# Patient Record
Sex: FEMALE | Race: WHITE | NOT HISPANIC OR LATINO | Employment: FULL TIME | URBAN - METROPOLITAN AREA
[De-identification: names, ages, dates, MRNs, and addresses within clinical notes are randomized per-mention and may not be internally consistent; named-entity substitution may affect disease eponyms.]

---

## 2021-01-01 ENCOUNTER — TRANSFERRED RECORDS (OUTPATIENT)
Dept: MULTI SPECIALTY CLINIC | Facility: CLINIC | Age: 66
End: 2021-01-01

## 2023-10-03 ENCOUNTER — TRANSFERRED RECORDS (OUTPATIENT)
Dept: MULTI SPECIALTY CLINIC | Facility: CLINIC | Age: 68
End: 2023-10-03

## 2023-10-03 LAB — RETINOPATHY: NORMAL

## 2023-12-04 ENCOUNTER — OFFICE VISIT (OUTPATIENT)
Dept: FAMILY MEDICINE | Facility: CLINIC | Age: 68
End: 2023-12-04
Payer: COMMERCIAL

## 2023-12-04 VITALS
TEMPERATURE: 97.4 F | WEIGHT: 165.5 LBS | HEIGHT: 66 IN | DIASTOLIC BLOOD PRESSURE: 83 MMHG | OXYGEN SATURATION: 97 % | BODY MASS INDEX: 26.6 KG/M2 | SYSTOLIC BLOOD PRESSURE: 123 MMHG | RESPIRATION RATE: 16 BRPM | HEART RATE: 88 BPM

## 2023-12-04 DIAGNOSIS — Z13.220 LIPID SCREENING: ICD-10-CM

## 2023-12-04 DIAGNOSIS — E11.9 TYPE 2 DIABETES MELLITUS WITHOUT COMPLICATION, WITHOUT LONG-TERM CURRENT USE OF INSULIN (H): ICD-10-CM

## 2023-12-04 DIAGNOSIS — L30.9 VULVAR DERMATITIS: ICD-10-CM

## 2023-12-04 DIAGNOSIS — Z85.3 HISTORY OF BREAST CANCER: ICD-10-CM

## 2023-12-04 DIAGNOSIS — Z00.00 ENCOUNTER FOR MEDICARE ANNUAL WELLNESS EXAM: Primary | ICD-10-CM

## 2023-12-04 DIAGNOSIS — Z12.31 VISIT FOR SCREENING MAMMOGRAM: ICD-10-CM

## 2023-12-04 DIAGNOSIS — Z78.0 POST-MENOPAUSAL: ICD-10-CM

## 2023-12-04 PROCEDURE — 99214 OFFICE O/P EST MOD 30 MIN: CPT | Mod: 25 | Performed by: FAMILY MEDICINE

## 2023-12-04 PROCEDURE — 99387 INIT PM E/M NEW PAT 65+ YRS: CPT | Performed by: FAMILY MEDICINE

## 2023-12-04 RX ORDER — CLOBETASOL PROPIONATE 0.5 MG/G
OINTMENT TOPICAL 2 TIMES DAILY
Qty: 60 G | Refills: 1 | Status: SHIPPED | OUTPATIENT
Start: 2023-12-04 | End: 2024-03-01

## 2023-12-04 SDOH — HEALTH STABILITY: PHYSICAL HEALTH: ON AVERAGE, HOW MANY DAYS PER WEEK DO YOU ENGAGE IN MODERATE TO STRENUOUS EXERCISE (LIKE A BRISK WALK)?: 2 DAYS

## 2023-12-04 ASSESSMENT — ENCOUNTER SYMPTOMS
NERVOUS/ANXIOUS: 0
HEMATOCHEZIA: 0
SORE THROAT: 0
BREAST MASS: 0
HEMATURIA: 0
ABDOMINAL PAIN: 0
MYALGIAS: 0
ARTHRALGIAS: 0
NAUSEA: 0
COUGH: 0
DIARRHEA: 0
HEARTBURN: 0
FEVER: 0
SHORTNESS OF BREATH: 0
DIZZINESS: 0
DYSURIA: 0
FREQUENCY: 0
CONSTIPATION: 0
JOINT SWELLING: 0
CHILLS: 0
WEAKNESS: 0
HEADACHES: 0
EYE PAIN: 0
PALPITATIONS: 0

## 2023-12-04 ASSESSMENT — SOCIAL DETERMINANTS OF HEALTH (SDOH)
IN A TYPICAL WEEK, HOW MANY TIMES DO YOU TALK ON THE PHONE WITH FAMILY, FRIENDS, OR NEIGHBORS?: MORE THAN THREE TIMES A WEEK
HOW OFTEN DO YOU ATTENT MEETINGS OF THE CLUB OR ORGANIZATION YOU BELONG TO?: MORE THAN 4 TIMES PER YEAR
DO YOU BELONG TO ANY CLUBS OR ORGANIZATIONS SUCH AS CHURCH GROUPS UNIONS, FRATERNAL OR ATHLETIC GROUPS, OR SCHOOL GROUPS?: YES

## 2023-12-04 ASSESSMENT — PAIN SCALES - GENERAL: PAINLEVEL: NO PAIN (0)

## 2023-12-04 ASSESSMENT — LIFESTYLE VARIABLES: HOW OFTEN DO YOU HAVE A DRINK CONTAINING ALCOHOL: 2-4 TIMES A MONTH

## 2023-12-04 ASSESSMENT — ACTIVITIES OF DAILY LIVING (ADL): CURRENT_FUNCTION: NO ASSISTANCE NEEDED

## 2023-12-04 NOTE — PROGRESS NOTES
"SUBJECTIVE:   Suyapa is a 68 year old, presenting for the following:  Physical        12/4/2023     3:38 PM   Additional Questions   Roomed by Zelda GEORGE CMA     Here for physical.    Rash on inner labia, red, sometimes itchy.  Has tried different soaps, etc.  OTC topicals.  Nothing helps.    Colonoscopy due in 2026, done in 2021 [facility] in [place].    Are you in the first 12 months of your Medicare coverage?  No    Healthy Habits:     In general, how would you rate your overall health?  Excellent    Frequency of exercise:  2-3 days/week    Duration of exercise:  15-30 minutes    Do you usually eat at least 4 servings of fruit and vegetables a day, include whole grains    & fiber and avoid regularly eating high fat or \"junk\" foods?  Yes    Taking medications regularly:  Yes    Medication side effects:  None    Ability to successfully perform activities of daily living:  No assistance needed    Home Safety:  No safety concerns identified    Hearing Impairment:  Difficulty following a conversation in a noisy restaurant or crowded room    In the past 6 months, have you been bothered by leaking of urine? Yes    In general, how would you rate your overall mental or emotional health?  Excellent    Additional concerns today:  Yes    Today's PHQ-2 Score:       12/4/2023     3:32 PM   PHQ-2 ( 1999 [employer])   Q1: Little interest or pleasure in doing things 0   Q2: Feeling down, depressed or hopeless 0   PHQ-2 Score 0   Q1: Little interest or pleasure in doing things Not at all   Q2: Feeling down, depressed or hopeless Not at all   PHQ-2 Score 0     Have you ever done Advance Care Planning? (For example, a Health Directive, POLST, or a discussion with a medical provider or your loved ones about your wishes): No, advance care planning information given to patient to review.  Patient declined advance care planning discussion at this time.     Fall risk  Fallen 2 or more times in the past year?: No  Any fall with injury in the past " year?: No    Cognitive Screening   1) Repeat 3 items (Leader, Season, Table)    2) Clock draw: NORMAL  3) 3 item recall: Recalls NO objects   Results: 0 items recalled: PROBABLE COGNITIVE IMPAIRMENT, **INFORM PROVIDER**    Mini-CogTM Copyright ANNE MARIE Moreau. Licensed by the author for use in [facility]; reprinted with permission (brennan@[facility]). All rights reserved.      Reviewed and updated as needed this visit by clinical staff   Tobacco  Allergies  Meds   Med Hx  Surg Hx  Fam Hx        Reviewed and updated as needed this visit by Provider   Tobacco   Meds   Med Hx  Surg Hx  Fam Hx         Social History     Tobacco Use    Smoking status: Former     Packs/day: 0.50     Years: 9.00     Additional pack years: 0.00     Total pack years: 4.50     Types: Cigarettes     Start date: 1974     Quit date: 1983     Years since quittin.9    Smokeless tobacco: Never   Substance Use Topics    Alcohol use: Yes     Comment: [email or web address]         2023     3:32 PM   Alcohol Use   Prescreen: >3 drinks/day or >7 drinks/week? No     Do you have a current opioid prescription? No  Do you use any other controlled substances or medications that are not prescribed by a provider? None      Current providers sharing in care for this patient include:   Patient Care Team:  Jessica, Kendal Benavidez as PCP - General    The following health maintenance items are reviewed in Epic and correct as of today:  Health Maintenance   Topic Date Due    DEXA  Never done    COLORECTAL CANCER SCREENING  Never done    LIPID  Never done    ZOSTER IMMUNIZATION (1 of 2) Never done    MAMMO SCREENING  2014    RSV VACCINE (Pregnancy & 60+) (1 - 1-dose 60+ series) Never done    Pneumococcal Vaccine: 65+ Years (2 - PCV) 10/05/2022    MEDICARE ANNUAL WELLNESS VISIT  2024    ANNUAL REVIEW OF HM ORDERS  2024    FALL RISK ASSESSMENT  2024    DTAP/TDAP/TD IMMUNIZATION (4 - Td or Tdap) 2028    ADVANCE CARE PLANNING   2028    HEPATITIS C SCREENING  Completed    PHQ-2 (once per calendar year)  Completed    INFLUENZA VACCINE  Completed    COVID-19 Vaccine  Completed    IPV IMMUNIZATION  Aged Out    HPV IMMUNIZATION  Aged Out    MENINGITIS IMMUNIZATION  Aged Out    RSV MONOCLONAL ANTIBODY  Aged Out     Lab work is in process  Labs reviewed in EPIC  BP Readings from Last 3 Encounters:   23 123/83   14 142/78   12 129/75    Wt Readings from Last 3 Encounters:   23 75.1 kg (165 lb 8 oz)   14 93.4 kg (206 lb)   12 81.6 kg (180 lb)                  Patient Active Problem List   Diagnosis    History of breast cancer    Type 2 diabetes mellitus without complication, without long-term current use of insulin (H)     Past Surgical History:   Procedure Laterality Date    BIOPSY BREAST NEEDLE LOCALIZATION  2012    Procedure: BIOPSY BREAST NEEDLE LOCALIZATION;  Left Breast Wire Localized Excisional Biopsy ;  Surgeon: Festus Solano MD;  Location: [facility] OR    pre-cancerous cells on face removed[      wisdom teeth[         Social History     Tobacco Use    Smoking status: Former     Packs/day: 0.50     Years: 9.00     Additional pack years: 0.00     Total pack years: 4.50     Types: Cigarettes     Start date: 1974     Quit date: 1983     Years since quittin.9    Smokeless tobacco: Never   Substance Use Topics    Alcohol use: Yes     Comment: occas     Family History   Problem Relation Age of Onset    Memory loss Mother     Lung Cancer Mother     Memory loss Father          Current Outpatient Medications   Medication Sig Dispense Refill    clobetasol (TEMOVATE) 0.05 % external ointment Apply topically 2 times daily 60 g 1    UNABLE TO FIND MEDICATION NAME: Plexus Ease, Plexus Slim       No Known Allergies  No lab results found.       Mammogram Screening: Mammogram Screening: Recommended mammography every 1-2 years with patient discussion and risk factor consideration    FHS-7:        "12/4/2023     3:37 PM   Breast CA Risk Assessment (FHS-7)   Did any of your first-degree relatives have breast or ovarian cancer? Yes   Did any of your relatives have bilateral breast cancer? No   Did any man in your family have breast cancer? No   Did any woman in your family have breast and ovarian cancer? Yes   Did any woman in your family have breast cancer before age 50 y? No   Do you have 2 or more relatives with breast and/or ovarian cancer? No   Do you have 2 or more relatives with breast and/or bowel cancer? No       Mammogram Screening: Recommended mammography every 1-2 years with patient discussion and risk factor consideration  Pertinent mammograms are reviewed under the imaging tab.    Review of Systems   Constitutional:  Negative for chills and fever.   HENT:  Negative for congestion, ear pain, hearing loss and sore throat.    Eyes:  Negative for pain and visual disturbance.   Respiratory:  Negative for cough and shortness of breath.    Cardiovascular:  Negative for chest pain, palpitations and peripheral edema.   Gastrointestinal:  Negative for abdominal pain, constipation, diarrhea, heartburn, hematochezia and nausea.   Breasts:  Negative for tenderness, breast mass and discharge.   Genitourinary:  Positive for urgency. Negative for dysuria, frequency, genital sores, hematuria, pelvic pain, vaginal bleeding and vaginal discharge.   Musculoskeletal:  Negative for arthralgias, joint swelling and myalgias.   Skin:  Negative for rash.   Neurological:  Negative for dizziness, weakness and headaches.   Psychiatric/Behavioral:  Negative for mood changes. The patient is not nervous/anxious.          OBJECTIVE:   /83 (BP Location: Right arm, Patient Position: Sitting, Cuff Size: Adult Regular)   Pulse 88   Temp 97.4  F (36.3  C) (Oral)   Resp 16   Ht 1.664 m (5' 5.5\")   Wt 75.1 kg (165 lb 8 oz)   LMP  (LMP Unknown)   SpO2 97%   BMI 27.12 kg/m   Estimated body mass index is 27.12 kg/m  as " "calculated from the following:    Height as of this encounter: 1.664 m (5' 5.5\").    Weight as of this encounter: 75.1 kg (165 lb 8 oz).  Physical Exam  GENERAL: healthy, alert and no distress  RESP: lungs clear to auscultation - no rales, rhonchi or wheezes  CV: regular rates and rhythm   (female): vulvar skin dry and erythematous    Diagnostic Test Results:  Labs reviewed in Epic    ASSESSMENT / PLAN:       ICD-10-CM    1. Encounter for Medicare annual wellness exam  Z00.00 DX Hip/Pelvis/Spine      2. Vulvar dermatitis  L30.9 clobetasol (TEMOVATE) 0.05 % external ointment      3. Type 2 diabetes mellitus without complication, without long-term current use of insulin (H)  E11.9 Patient had A1c done in 2021, was 8.1, has not done any follow up.  Does not take medication currently, does not check sugars.  Will likely get her set up with a glucometer and diabetes education regardless of result, consider medication pending results.  Hemoglobin A1c      4. Post-menopausal  Z78.0 DX Hip/Pelvis/Spine      5. Visit for screening mammogram  Z12.31 MA Screen Bilateral w/Roberto      6. History of breast cancer  Z85.3 MA Screen Bilateral w/Roberto      7. Lipid screening  Z13.220 Lipid panel reflex to direct LDL Fasting          Patient has been advised of split billing requirements and indicates understanding: Yes    COUNSELING:  Reviewed preventive health counseling, as reflected in patient instructions       Immunizations  Declined immunizations        She reports that she quit smoking about 40 years ago. Her smoking use included cigarettes. She started smoking about 49 years ago. She has a 4.50 pack-year smoking history. She has never used smokeless tobacco.    Appropriate preventive services were discussed with this patient, including applicable screening as appropriate for fall prevention, nutrition, physical activity, Tobacco-use cessation, weight loss and cognition.  Checklist reviewing preventive services available has " been given to the patient.    Reviewed patients plan of care and provided an AVS. The Basic Care Plan (routine screening as documented in Health Maintenance) for Suyapa meets the Care Plan requirement. This Care Plan has been established and reviewed with the Patient.        Maribell Vásquez MD  [facility]    Identified Health Risks:  I have reviewed Opioid Use Disorder and Substance Use Disorder risk factors and made any needed referrals. The patient was provided with written information regarding signs of hearing loss.  Information on urinary incontinence and treatment options given to patient.

## 2023-12-04 NOTE — Clinical Note
Please abstract the following data from this visit with this patient into the appropriate field in Epic:  Tests that can be patient reported without a hard copy:  Colonoscopy done on this date: 2021 [facility] in [place]. (approximately), by this group: results were normal.   Other Tests found in the patient's chart through Chart Review/Care Everywhere:    Note to Abstraction: If this section is blank, no results were found via Chart Review/Care Everywhere.

## 2023-12-04 NOTE — PATIENT INSTRUCTIONS
Need Shingles vaccines (2), RSV, and Pneumonia vaccine (Prevnar 20)    Patient Education   Personalized Prevention Plan  You are due for the preventive services outlined below.  Your care team is available to assist you in scheduling these services.  If you have already completed any of these items, please share that information with your care team to update in your medical record.  Health Maintenance Due   Topic Date Due    Osteoporosis Screening  Never done    ANNUAL REVIEW OF HM ORDERS  Never done    Colorectal Cancer Screening  Never done    Hepatitis C Screening  Never done    Cholesterol Lab  Never done    Zoster (Shingles) Vaccine (1 of 2) Never done    LUNG CANCER SCREENING  Never done    Mammogram  07/25/2014    RSV VACCINE (Pregnancy & 60+) (1 - 1-dose 60+ series) Never done    Annual Wellness Visit  10/05/2022    Pneumococcal Vaccine (2 - PCV) 10/05/2022     Hearing Loss: Care Instructions  Overview     Hearing loss is a sudden or slow decrease in how well you hear. It can range from slight to profound. Permanent hearing loss can occur with aging. It also can happen when you are exposed long-term to loud noise. Examples include listening to loud music, riding motorcycles, or being around other loud machines.  Hearing loss can affect your work and home life. It can make you feel lonely or depressed. You may feel that you have lost your independence. But hearing aids and other devices can help you hear better and feel connected to others.  Follow-up care is a key part of your treatment and safety. Be sure to make and go to all appointments, and call your doctor if you are having problems. It's also a good idea to know your test results and keep a list of the medicines you take.  How can you care for yourself at home?  Avoid loud noises whenever possible. This helps keep your hearing from getting worse.  Always wear hearing protection around loud noises.  Wear a hearing aid as directed.  A professional can  "help you pick a hearing aid that will work best for you.  You can also get hearing aids over the counter for mild to moderate hearing loss.  Have hearing tests as your doctor suggests. They can show whether your hearing has changed. Your hearing aid may need to be adjusted.  Use other devices as needed. These may include:  Telephone amplifiers and hearing aids that can connect to a television, stereo, radio, or microphone.  Devices that use lights or vibrations. These alert you to the doorbell, a ringing telephone, or a baby monitor.  Television closed-captioning. This shows the words at the bottom of the screen. Most new TVs can do this.  TTY (text telephone). This lets you type messages back and forth on the telephone instead of talking or listening. These devices are also called TDD. When messages are typed on the keyboard, they are sent over the phone line to a receiving TTY. The message is shown on a monitor.  Use text messaging, social media, and email if it is hard for you to communicate by telephone.  Try to learn a listening technique called speechreading. It is not lipreading. You pay attention to people's gestures, expressions, posture, and tone of voice. These clues can help you understand what a person is saying. Face the person you are talking to, and have them face you. Make sure the lighting is good. You need to see the other person's face clearly.  Think about counseling if you need help to adjust to your hearing loss.  When should you call for help?  Watch closely for changes in your health, and be sure to contact your doctor if:    You think your hearing is getting worse.     You have new symptoms, such as dizziness or nausea.   Where can you learn more?  Go to https://www.[employer].net/patiented  Enter R798 in the search box to learn more about \"Hearing Loss: Care Instructions.\"  Current as of: February 28, 2023               Content Version: 13.8    1255-0683 [employer].   Care " instructions adapted under license by your healthcare professional. If you have questions about a medical condition or this instruction, always ask your healthcare professional. [employer] disclaims any warranty or liability for your use of this information.      Bladder Training: Care Instructions  Your Care Instructions     Bladder training is used to treat urge incontinence and stress incontinence. Urge incontinence means that the need to urinate comes on so fast that you can't get to a toilet in time. Stress incontinence means that you leak urine because of pressure on your bladder. For example, it may happen when you laugh, cough, or lift something heavy.  Bladder training can increase how long you can wait before you have to urinate. It can also help your bladder hold more urine. And it can give you better control over the urge to urinate.  It is important to remember that bladder training takes a few weeks to a few months to make a difference. You may not see results right away, but don't give up.  Follow-up care is a key part of your treatment and safety. Be sure to make and go to all appointments, and call your doctor if you are having problems. It's also a good idea to know your test results and keep a list of the medicines you take.  How can you care for yourself at home?  Work with your doctor to come up with a bladder training program that is right for you. You may use one or more of the following methods.  Delayed urination  In the beginning, try to keep from urinating for 5 minutes after you first feel the need to go.  While you wait, take deep, slow breaths to relax. Kegel exercises can also help you delay the need to go to the bathroom.  After some practice, when you can easily wait 5 minutes to urinate, try to wait 10 minutes before you urinate.  Slowly increase the waiting period until you are able to control when you have to urinate.  Scheduled urination  Empty your bladder when you  "first wake up in the morning.  Schedule times throughout the day when you will urinate.  Start by going to the bathroom every hour, even if you don't need to go.  Slowly increase the time between trips to the bathroom.  When you have found a schedule that works well for you, keep doing it.  If you wake up during the night and have to urinate, do it. Apply your schedule to waking hours only.  Kegel exercises  These tighten and strengthen pelvic muscles, which can help you control the flow of urine. (If doing these exercises causes pain, stop doing them and talk with your doctor.) To do Kegel exercises:  Squeeze your muscles as if you were trying not to pass gas. Or squeeze your muscles as if you were stopping the flow of urine. Your belly, legs, and buttocks shouldn't move.  Hold the squeeze for 3 seconds, then relax for 5 to 10 seconds.  Start with 3 seconds, then add 1 second each week until you are able to squeeze for 10 seconds.  Repeat the exercise 10 times a session. Do 3 to 8 sessions a day.  When should you call for help?  Watch closely for changes in your health, and be sure to contact your doctor if:    Your incontinence is getting worse.     You do not get better as expected.   Where can you learn more?  Go to https://www.[employer].net/patiented  Enter V684 in the search box to learn more about \"Bladder Training: Care Instructions.\"  Current as of: February 28, 2023               Content Version: 13.8    0266-9468 [employer].   Care instructions adapted under license by your healthcare professional. If you have questions about a medical condition or this instruction, always ask your healthcare professional. [employer] disclaims any warranty or liability for your use of this information.         "

## 2024-01-14 ENCOUNTER — HEALTH MAINTENANCE LETTER (OUTPATIENT)
Age: 69
End: 2024-01-14

## 2024-01-16 ENCOUNTER — ANCILLARY PROCEDURE (OUTPATIENT)
Dept: MAMMOGRAPHY | Facility: CLINIC | Age: 69
End: 2024-01-16
Attending: FAMILY MEDICINE
Payer: COMMERCIAL

## 2024-01-16 DIAGNOSIS — Z12.31 VISIT FOR SCREENING MAMMOGRAM: ICD-10-CM

## 2024-01-16 DIAGNOSIS — Z85.3 HISTORY OF BREAST CANCER: ICD-10-CM

## 2024-01-16 PROCEDURE — 77067 SCR MAMMO BI INCL CAD: CPT | Mod: TC | Performed by: RADIOLOGY

## 2024-01-16 PROCEDURE — 77063 BREAST TOMOSYNTHESIS BI: CPT | Mod: TC | Performed by: RADIOLOGY

## 2024-02-06 ENCOUNTER — LAB (OUTPATIENT)
Dept: LAB | Facility: CLINIC | Age: 69
End: 2024-02-06
Payer: COMMERCIAL

## 2024-02-06 DIAGNOSIS — E11.9 TYPE 2 DIABETES MELLITUS WITHOUT COMPLICATION, WITHOUT LONG-TERM CURRENT USE OF INSULIN (H): ICD-10-CM

## 2024-02-06 DIAGNOSIS — Z13.220 LIPID SCREENING: ICD-10-CM

## 2024-02-06 LAB — HBA1C MFR BLD: 13.8 % (ref 0–5.6)

## 2024-02-06 PROCEDURE — 83036 HEMOGLOBIN GLYCOSYLATED A1C: CPT

## 2024-02-06 PROCEDURE — 80061 LIPID PANEL: CPT

## 2024-02-06 PROCEDURE — 80048 BASIC METABOLIC PNL TOTAL CA: CPT

## 2024-02-06 PROCEDURE — 82570 ASSAY OF URINE CREATININE: CPT

## 2024-02-06 PROCEDURE — 36415 COLL VENOUS BLD VENIPUNCTURE: CPT

## 2024-02-06 PROCEDURE — 82043 UR ALBUMIN QUANTITATIVE: CPT

## 2024-02-07 LAB
ANION GAP SERPL CALCULATED.3IONS-SCNC: 9 MMOL/L (ref 7–15)
BUN SERPL-MCNC: 12.2 MG/DL (ref 8–23)
CALCIUM SERPL-MCNC: 10 MG/DL (ref 8.8–10.2)
CHLORIDE SERPL-SCNC: 102 MMOL/L (ref 98–107)
CHOLEST SERPL-MCNC: 285 MG/DL
CREAT SERPL-MCNC: 0.6 MG/DL (ref 0.51–0.95)
CREAT UR-MCNC: 58.7 MG/DL
DEPRECATED HCO3 PLAS-SCNC: 26 MMOL/L (ref 22–29)
EGFRCR SERPLBLD CKD-EPI 2021: >90 ML/MIN/1.73M2
FASTING STATUS PATIENT QL REPORTED: YES
GLUCOSE SERPL-MCNC: 342 MG/DL (ref 70–99)
HDLC SERPL-MCNC: 54 MG/DL
LDLC SERPL CALC-MCNC: 197 MG/DL
MICROALBUMIN UR-MCNC: 48.7 MG/L
MICROALBUMIN/CREAT UR: 82.96 MG/G CR (ref 0–25)
NONHDLC SERPL-MCNC: 231 MG/DL
POTASSIUM SERPL-SCNC: 4.4 MMOL/L (ref 3.4–5.3)
SODIUM SERPL-SCNC: 137 MMOL/L (ref 135–145)
TRIGL SERPL-MCNC: 168 MG/DL

## 2024-02-11 DIAGNOSIS — E78.5 HYPERLIPIDEMIA LDL GOAL <100: ICD-10-CM

## 2024-02-11 DIAGNOSIS — R80.9 TYPE 2 DIABETES MELLITUS WITH MICROALBUMINURIA, WITHOUT LONG-TERM CURRENT USE OF INSULIN (H): Primary | ICD-10-CM

## 2024-02-11 DIAGNOSIS — E11.29 TYPE 2 DIABETES MELLITUS WITH MICROALBUMINURIA, WITHOUT LONG-TERM CURRENT USE OF INSULIN (H): Primary | ICD-10-CM

## 2024-02-11 RX ORDER — LANCETS
EACH MISCELLANEOUS
Qty: 100 EACH | Refills: 6 | Status: SHIPPED | OUTPATIENT
Start: 2024-02-11

## 2024-02-11 RX ORDER — METFORMIN HCL 500 MG
TABLET, EXTENDED RELEASE 24 HR ORAL
Qty: 360 TABLET | Refills: 0 | Status: SHIPPED | OUTPATIENT
Start: 2024-02-11 | End: 2024-05-21

## 2024-02-11 RX ORDER — ATORVASTATIN CALCIUM 20 MG/1
20 TABLET, FILM COATED ORAL DAILY
Qty: 90 TABLET | Refills: 3 | Status: SHIPPED | OUTPATIENT
Start: 2024-02-11

## 2024-02-12 ENCOUNTER — TELEPHONE (OUTPATIENT)
Dept: FAMILY MEDICINE | Facility: CLINIC | Age: 69
End: 2024-02-12
Payer: COMMERCIAL

## 2024-02-12 NOTE — TELEPHONE ENCOUNTER
See message below concerning result notice     April Edith Vásquez MD  2/11/2024  6:59 PM CST Back to Top      New SB3, please use EDMUND or any spot to get her in to discuss diabetes, A1c 13.8.  She had an A1c of 8 several years ago and did nothing.      Append Comments   Seen    [place],     Your A1c came back much higher than when you last checked it.  You have very uncontrolled diabetes and it is important that we get this managed.  I am going to send in a prescription for you to start taking right away, called Metformin.  You are likely going to need more than one medication to get this under control, but I would like to have you start this now.  Someone from our clinic will call you to schedule a visit for us to talk more about this as soon as possible     I have placed orders for the diabetes educators to call you and schedule a visit as well.  Someone will also call you to schedule a diabetic eye exam.       I am placing orders for a glucometer to check your blood sugars.     - LMTCB # 1. When calls back, please assist in scheduling visit with [facility] in EDMUND spot.    Karl Brantley RN  Patient Advocate Liaison (PAL)  [facility]  [phone]    02/12/2024 at 9:29 AM

## 2024-02-13 NOTE — TELEPHONE ENCOUNTER
- Called and spoke with patient, states that she actually wants to establish care in [place] as it is closer to her house. Patient requests number to call to schedule. Provided phone number, and advised that if they are booking out a ways, can still schedule with Dr. JOE to get started on the DM Follow-up. Provided patient with PAL RN direct phone number.     - Patient will call over to [facility] to schedule, and if they are booked out a ways, can schedule in a EDMUND with Dr. ACH. Patient understands and will call back if there is a long wait.     - Patient also still thinking about the [facility] referral. PAL RN provided information pertaining to what would be done with CDE and the purpose of the program. Patient states that she will continue to consider, and will call to schedule if she wants.     Karl Brantley, RN  Patient Advocate Liaison (PAL)  [facility]  [phone]    02/13/2024 at 1:19 PM

## 2024-02-24 ENCOUNTER — TRANSFERRED RECORDS (OUTPATIENT)
Dept: MULTI SPECIALTY CLINIC | Facility: CLINIC | Age: 69
End: 2024-02-24

## 2024-02-24 LAB — RETINOPATHY: NORMAL

## 2024-03-01 ENCOUNTER — OFFICE VISIT (OUTPATIENT)
Dept: PEDIATRICS | Facility: CLINIC | Age: 69
End: 2024-03-01
Payer: COMMERCIAL

## 2024-03-01 VITALS
RESPIRATION RATE: 16 BRPM | DIASTOLIC BLOOD PRESSURE: 60 MMHG | HEIGHT: 66 IN | BODY MASS INDEX: 26.2 KG/M2 | OXYGEN SATURATION: 98 % | TEMPERATURE: 97.3 F | HEART RATE: 76 BPM | SYSTOLIC BLOOD PRESSURE: 114 MMHG | WEIGHT: 163 LBS

## 2024-03-01 DIAGNOSIS — Z13.820 SCREENING FOR OSTEOPOROSIS: ICD-10-CM

## 2024-03-01 DIAGNOSIS — Z85.3 HISTORY OF BREAST CANCER: ICD-10-CM

## 2024-03-01 DIAGNOSIS — R80.9 TYPE 2 DIABETES MELLITUS WITH MICROALBUMINURIA, WITHOUT LONG-TERM CURRENT USE OF INSULIN (H): ICD-10-CM

## 2024-03-01 DIAGNOSIS — Z76.89 ENCOUNTER TO ESTABLISH CARE: Primary | ICD-10-CM

## 2024-03-01 DIAGNOSIS — L30.9 VULVAR DERMATITIS: ICD-10-CM

## 2024-03-01 DIAGNOSIS — E11.29 TYPE 2 DIABETES MELLITUS WITH MICROALBUMINURIA, WITHOUT LONG-TERM CURRENT USE OF INSULIN (H): ICD-10-CM

## 2024-03-01 DIAGNOSIS — E66.3 OVERWEIGHT (BMI 25.0-29.9): ICD-10-CM

## 2024-03-01 DIAGNOSIS — E11.9 TYPE 2 DIABETES MELLITUS WITHOUT COMPLICATION, WITHOUT LONG-TERM CURRENT USE OF INSULIN (H): ICD-10-CM

## 2024-03-01 DIAGNOSIS — E78.5 HYPERLIPIDEMIA LDL GOAL <100: ICD-10-CM

## 2024-03-01 PROCEDURE — 99215 OFFICE O/P EST HI 40 MIN: CPT | Mod: 25 | Performed by: NURSE PRACTITIONER

## 2024-03-01 PROCEDURE — 36415 COLL VENOUS BLD VENIPUNCTURE: CPT | Performed by: NURSE PRACTITIONER

## 2024-03-01 PROCEDURE — 90471 IMMUNIZATION ADMIN: CPT | Performed by: NURSE PRACTITIONER

## 2024-03-01 PROCEDURE — 84443 ASSAY THYROID STIM HORMONE: CPT | Performed by: NURSE PRACTITIONER

## 2024-03-01 PROCEDURE — 90677 PCV20 VACCINE IM: CPT | Performed by: NURSE PRACTITIONER

## 2024-03-01 RX ORDER — CLOBETASOL PROPIONATE 0.5 MG/G
OINTMENT TOPICAL 2 TIMES DAILY
Qty: 60 G | Refills: 1 | Status: SHIPPED | OUTPATIENT
Start: 2024-03-01

## 2024-03-01 ASSESSMENT — PAIN SCALES - GENERAL: PAINLEVEL: NO PAIN (0)

## 2024-03-01 NOTE — PROGRESS NOTES
Assessment & Plan     Encounter to establish care  Histories and medications reviewed and updated.     Screening for osteoporosis  - DX Hip/Pelvis/Spine; Future    History of breast cancer  Pt with hx 4 mm calcification found in left breast on routine mammo in 2012. Biopsy revealed lobular carcinoma in situ. Biopsy did not suggest estrogen dependence/independence. Surgically removed August 2012. She weaned off estradiol at that time as she had been having some postmenopausal bleeding. Pelvic US showed multiple fibroids and endometrial biopsy was benign. No longer following with any specialist, annual mammograms for surveillance.     Overweight (BMI 25.0-29.9)  Body mass index is 26.71 kg/m .    Type 2 diabetes mellitus with microalbuminuria, without long-term current use of insulin (H)  Uncontrolled, new diagnosis. Continue ramping up metformin as tolerated, will monitor her loose stools. Continue atorvastatin. She will try again to get the glucose meter from the pharmacy - recommend checking fasting blood sugars each morning and post prandial in the evening. Follow-up with MTM soonest available as I expect she will need another agent on board to get her A1C to goal. Follow-up with me in 3 months. Needs diabetic eye exam, which we discussed today. Goes to [facility], last appt 10/2023.   - Pneumococcal 20 Valent Conjugate (PCV20)  - Med Therapy Management Referral  - TSH w/Free T4 Reflex [[id number]]    Hyperlipidemia LDL goal <100  Continue atorvastatin 20 mg daily. Recommend follow-up with me in 3 months at which time we will recheck FLP.  - Med Therapy Management Referral    Vulvar dermatitis  Chronic, stable. Continue topical steroid, refilled.   - clobetasol (TEMOVATE) 0.05 % external ointment; Apply topically 2 times daily      43 minutes spent by me on the date of the encounter doing chart review, review of outside records, review of test results, interpretation of tests, patient visit, and documentation  "      BMI  Estimated body mass index is 26.71 kg/m  as calculated from the following:    Height as of this encounter: 1.664 m (5' 5.5\").    Weight as of this encounter: 73.9 kg (163 lb).   Weight management plan: Discussed healthy diet and exercise guidelines      MEDICATIONS:  Continue current medications without change  CONSULTATION/REFERRAL to MTM  FUTURE APPOINTMENTS:       - Follow-up visit in 3 months.     Sharifa Dale is a 68 year old, presenting for the following health issues:  Diabetes        3/1/2024    11:11 AM   Additional Questions   Roomed by Mary LOPEZ   Accompanied by Self         3/1/2024    11:11 AM   Patient Reported Additional Medications   Patient reports taking the following new medications n/a     Via the Health Maintenance questionnaire, the patient has reported the following services have been completed -Eye Exam, this information has been sent to the abstraction team.    Presents to establish primary care.     #DM2  #hyperlipidemia  Recently diagnosed (2/6/24) with uncontrolled type 2 diabetes with A1C 13.8. Started on metformin and atorvastatin and given rx for glucose meter. Pharmacy wouldn't let her pick it up unless she knew how to use it. 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 Her brother is also a doctor. Has been tolerating the metformin although it sounds like it may be the culprit to some loose stools she has been having.     Goes to [facility], last appt 10/2023.   Denies numbness tingling in her feet.   Takes plexus - a drink from a company that sells supplements.   Takes another supplement called ease to help her back pain.     #breast cancer  Pt with hx 4 mm calcification found in left breast on routine mammo in 2012. Biopsy revealed lobular carcinoma in situ. Biopsy did not suggest estrogen dependence/independence. Surgically removed August 2012. She weaned off estradiol at that " "time as she had been having some postmenopausal bleeding. Pelvic US showed multiple fibroids and endometrial biopsy was benign.     #vulvar dermatitis  Well managed on clobetasol.     #hm  Pap - utd. Denies hx of abnormals.   Mammo - utd  Colonoscopy - done through [facility] in 2021, results reportedly normal.   DEXA - never done      Patient Active Problem List   Diagnosis    History of breast cancer    Type 2 diabetes mellitus without complication, without long-term current use of insulin (H)     Past Medical History:   Diagnosis Date    AK (actinic keratosis) 2012    face    Diabetes mellitus, type 2 (H)     Hyperlipidemia LDL goal <100     Postmenopausal bleeding     US showed fibroids, neg endometrial biopsy     Past Surgical History:   Procedure Laterality Date    BIOPSY BREAST NEEDLE LOCALIZATION  08/30/2012    Procedure: BIOPSY BREAST NEEDLE LOCALIZATION;  Left Breast Wire Localized Excisional Biopsy ;  Surgeon: Festus Solano MD;  Location: [facility] OR    BIOPSY OF SKIN LESION      face, AK    wisdom teeth[       Family History   Problem Relation Age of Onset    Memory loss Mother     Lung Cancer Mother 89    Memory loss Father     Alzheimer Disease Father      Current Outpatient Medications   Medication    atorvastatin (LIPITOR) 20 MG tablet    blood glucose (NO BRAND SPECIFIED) test strip    blood glucose monitoring (NO BRAND SPECIFIED) meter device kit    clobetasol (TEMOVATE) 0.05 % external ointment    metFORMIN (GLUCOPHAGE XR) 500 MG 24 hr tablet    thin (NO BRAND SPECIFIED) lancets    UNABLE TO FIND     No current facility-administered medications for this visit.      No Known Allergies          Objective    /60   Pulse 76   Temp 97.3  F (36.3  C) (Temporal)   Resp 16   Ht 1.664 m (5' 5.5\")   Wt 73.9 kg (163 lb)   LMP  (LMP Unknown)   SpO2 98%   BMI 26.71 kg/m    Body mass index is 26.71 kg/m .  Physical Exam  Constitutional:       General: She is not in acute distress.     Appearance: " Normal appearance. She is not ill-appearing or toxic-appearing.   Cardiovascular:      Rate and Rhythm: Normal rate.   Pulmonary:      Effort: Pulmonary effort is normal. No respiratory distress.   Skin:     General: Skin is warm and dry.   Neurological:      General: No focal deficit present.      Mental Status: She is alert and oriented to person, place, and time.   Psychiatric:         Mood and Affect: Mood normal.         Behavior: Behavior normal.              Signed Electronically by: JOSHUA Larose CNP     no fever and no chills.

## 2024-03-01 NOTE — PATIENT INSTRUCTIONS
You will see MTM (pharmacy) I a couple weeks to talk about the diabetes and your medications.     Try to get the glucose meter from the pharmacy. If you get it, I want you to check your blood sugar every morning before you eat and evening at least 1 hour after dinner.     I want to see you again in 3 months for routine follow-up of the diabetes.     DEXA scan to check your bone density.     Continue atorvastatin and metformin. Continue ramping up on the metformin as instructed. Keep an eye out for worsening diarrhea or loose stools which can be a side effect.     Schedule a diabetic eye exam.     We gave your pneumococcal vaccine today. You do not need another one of these.     Second shingles vaccine 2-6 months after the first. Get that at the pharmacy.

## 2024-03-02 LAB — TSH SERPL DL<=0.005 MIU/L-ACNC: 1.47 UIU/ML (ref 0.3–4.2)

## 2024-03-07 ENCOUNTER — ANCILLARY PROCEDURE (OUTPATIENT)
Dept: BONE DENSITY | Facility: CLINIC | Age: 69
End: 2024-03-07
Attending: NURSE PRACTITIONER
Payer: COMMERCIAL

## 2024-03-07 DIAGNOSIS — Z13.820 SCREENING FOR OSTEOPOROSIS: ICD-10-CM

## 2024-03-07 PROCEDURE — 77080 DXA BONE DENSITY AXIAL: CPT | Mod: TC | Performed by: RADIOLOGY

## 2024-03-11 PROBLEM — M85.80 OSTEOPENIA: Status: ACTIVE | Noted: 2024-03-11

## 2024-03-12 NOTE — PROGRESS NOTES
Medication Therapy Management (MTM) Encounter    ASSESSMENT:                            Medication Adherence/Access: No issues identified    Diabetes: Patient is not meeting A1c goal of < 7%.  Considere GLP-1 agonists, insulin, and combination products today (i.e. Soliqua).  Due to insurance coverage per a test claim, recommend Soliqua today and provided appropriate education on how to use.   Additionally offered continuous glucose monitor, but patient declines today since she has glucometer.  Educated on how to use glucometer today.  Future could consider addition of SGLT-2 inhibitor, was not considered today due to A1c > 12%.  Additionally could send GLP-1 agonist to the pharmacy to determine price, consider coupon card as well.  Patient not interested in CDE at this time or diabetes education book.    Hyperlipidemia: Patient is not meeting goal LDL < 70 mg/dL.  Statin appropriately started, recommend recheck lipid panel ~ 5/11/2024.    Osteopenia:: Stable. Patient is meeting RDI of calcium 1200mg/day. Patient is not meeting RDI of Vitamin D 1000 IU/day.  Was recommended to take 2000 units daily - discussed brand of product does not matter.  Consider check vitamin D level in 3-4 months.    Vulvar dermatitis: Stable.    Supplements: Stable.    Vaccines: Up to date per ACIP/CDC Guidelines.    PLAN:                            Start injecting Soliqua 15 units daily.  Increase by 2 units every week until fasting blood sugars < 130 mg/dL  Start vitamin D3 2000 units daily    Addendum 3/14/24: Rebecca Masterson approved Soliqua.    Follow-up: Return in 29 days (on 4/11/2024) for Follow up, with MTM Pharmacist.    SUBJECTIVE/OBJECTIVE:                          Suyapa Ware is a 68 year old female coming in for an initial visit. She was referred to me from Rebecca Masterson, JOSHUA CNP.      Reason for visit: new T2DM diagnosis, hyperlipidemia.    Allergies/ADRs: Reviewed in chart  Past Medical History: Reviewed in chart  Tobacco:  "She reports that she quit smoking about 41 years ago. Her smoking use included cigarettes. She started smoking about 50 years ago. She has a 4.5 pack-year smoking history. She has never been exposed to tobacco smoke. She has never used smokeless tobacco.  Alcohol: Less than 1 beverage / month    Medication Adherence/Access: no issues reported    Diabetes   Metformin 2000 mg daily    Patient is experiencing the following side effects: diarrhea occasionally, maybe a few times per week.  Bowel movements have been looser every since she started taking metformin  Blood sugar monitoring: has not started yet, brought in glucometer to discuss how to use today.  Was told to monitor twice daily  Current diabetes symptoms: none  Diet/Exercise: trying to eat more vegetables, fish, and fiber - is starting to follow more of a Mediterranean diet now.  Trying to do more walking and a pedal machine at her desk.  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  Feels she knows what to do for her diet and exercise.    Test Claim 3/12/24:   Results: Ozempic not covered, needs a pa Mounjaro not covered, needs a pa Trulicity not covered, needs a pa Victoza not covered, needs a pa Bydureon not covered, needs a pa Rybelsus not covered, needs a pa  Jardiance/Farxiga 30 days $50.00 Invokana not covered, needs a pa  Dexcom G6/G7 sensors 30 days $73.95 Freestyle elida 2/3 sensors 28 days $26.48  Novolog flexpen 15 ml per 30 days $25.00 Humalog kwikpen not covered  Lantus not covered Basaglar not covered Semglee 15 ml per 30 days $25.00  Soliqua 15 ml per 30 days $25.00 Xultophy not covered     Eye exam is up to date  Foot exam: due  Urine Albumin:   Lab Results   Component Value Date    UMALCR 82.96 (H) 02/06/2024      Lab Results   Component Value Date    A1C 13.8 (H) 02/06/2024   Estimated body mass index is 27.07 kg/m  as calculated from the following:    Height as of 3/1/24: 5' 5.5\" (1.664 m).    Weight as of this encounter: 165 lb " "3.2 oz (74.9 kg).     BP Readings from Last 3 Encounters:   03/13/24 122/78   03/01/24 114/60   12/04/23 123/83     Pulse Readings from Last 3 Encounters:   03/13/24 78   03/01/24 76   12/04/23 88       Hyperlipidemia   Atorvastatin 20 mg daily  Patient reports no significant myalgias or other side effects.  The 10-year ASCVD risk score (Susie LEE, et al., 2019) is: 15%    Values used to calculate the score:      Age: 68 years      Sex: Female      Is Non- : No      Diabetic: Yes      Tobacco smoker: No      Systolic Blood Pressure: 122 mmHg      Is BP treated: No      HDL Cholesterol: 54 mg/dL      Total Cholesterol: 285 mg/dL     Recent Labs   Lab Test 02/06/24  0718   CHOL 285*   HDL 54   *   TRIG 168*       Osteopenia:   No current medications  Patient is not experiencing side effects.  DEXA History: 3/7/2024 - recommended start vitamin D3 2000 units daily, but is wondering today if there's a specific brand or kind she should be taking.  Patient is getting  2-3 serving(s)/day of calcium in their diet.  Risk factors: post-menopausal  Last vitamin D level:  No results found for: \"VITDT\"      Vulvar dermatitis:   Clobetasol 0.05% ointment - daily as needed  No issues today, feels this is helping    Supplements:   Plexus and ease - was told it would help with blood sugars.  Also helps relax her back muscles.  Likes this, no issues today.    Today's Vitals: /78   Pulse 78   Wt 165 lb 3.2 oz (74.9 kg)   LMP  (LMP Unknown)   SpO2 100%   BMI 27.07 kg/m    ----------------    I spent 38 minutes with this patient today. All changes were made via verbal approval with JOSHUA Larose CNP. A copy of the visit note was provided to the patient's provider(s).    A summary of these recommendations was given to the patient.    Genny Reilly, PharmD  Pharmacy Resident  Pager #[phone]    Suyapa Bashirer was seen independently by Dr. Genny Reilly.  I have reviewed and agree with the " resident note and plan of care.      Cata Talbot, PharmD BCPS  Medication Therapy Management Provider  [phone]       Medication Therapy Recommendations  Osteopenia    Current Medication: vitamin D3 (CHOLECALCIFEROL) 50 mcg (2000 units) tablet   Rationale: Untreated condition - Needs additional medication therapy - Indication   Recommendation: Start Medication - vitamin D3 50 mcg (2000 units) tablet   Status: Accepted - no CPA Needed         Type 2 diabetes mellitus without complication, without long-term current use of insulin (H)    Rationale: Synergistic therapy - Needs additional medication therapy - Indication   Recommendation: Start Medication - insulin glargine-lixisenatide 100-33 UNT-MCG/ML pen   Status: Accepted per Provider          Rationale: Medication requires monitoring - Needs additional monitoring - Safety   Recommendation: Self-Monitoring - FreeStyle Faustino 3 Sensor Misc   Status: Declined per Patient

## 2024-03-13 ENCOUNTER — TELEPHONE (OUTPATIENT)
Dept: PEDIATRICS | Facility: CLINIC | Age: 69
End: 2024-03-13
Payer: COMMERCIAL

## 2024-03-13 ENCOUNTER — VIRTUAL VISIT (OUTPATIENT)
Dept: PHARMACY | Facility: CLINIC | Age: 69
End: 2024-03-13
Attending: NURSE PRACTITIONER
Payer: COMMERCIAL

## 2024-03-13 VITALS
DIASTOLIC BLOOD PRESSURE: 78 MMHG | SYSTOLIC BLOOD PRESSURE: 122 MMHG | OXYGEN SATURATION: 100 % | WEIGHT: 165.2 LBS | BODY MASS INDEX: 27.07 KG/M2 | HEART RATE: 78 BPM

## 2024-03-13 DIAGNOSIS — M85.80 OSTEOPENIA, UNSPECIFIED LOCATION: ICD-10-CM

## 2024-03-13 DIAGNOSIS — L30.9 VULVAR DERMATITIS: ICD-10-CM

## 2024-03-13 DIAGNOSIS — Z71.85 VACCINE COUNSELING: ICD-10-CM

## 2024-03-13 DIAGNOSIS — Z78.9 TAKES DIETARY SUPPLEMENTS: ICD-10-CM

## 2024-03-13 DIAGNOSIS — E11.9 TYPE 2 DIABETES MELLITUS WITHOUT COMPLICATION, WITHOUT LONG-TERM CURRENT USE OF INSULIN (H): Primary | ICD-10-CM

## 2024-03-13 DIAGNOSIS — E78.5 HYPERLIPIDEMIA LDL GOAL <70: ICD-10-CM

## 2024-03-13 PROCEDURE — 99605 MTMS BY PHARM NP 15 MIN: CPT | Mod: 93

## 2024-03-13 PROCEDURE — 99607 MTMS BY PHARM ADDL 15 MIN: CPT | Mod: 93

## 2024-03-13 RX ORDER — CHOLECALCIFEROL (VITAMIN D3) 50 MCG
1 TABLET ORAL DAILY
COMMUNITY

## 2024-03-13 NOTE — TELEPHONE ENCOUNTER
Mesfin Fernandez,    I met with Suyapa today, we discussed starting Soliqua to get the GLP-1 component with insulin.  It's looking like other GLP-1 agonists might not be covered, but in the future could send to pharmacy to see price and look into coupon cards.  Plan for Soliqua today, also need pen needles for patient.      Genny Reilly, PharmD  Pharmacy Resident  Pager #[phone]

## 2024-03-13 NOTE — PATIENT INSTRUCTIONS
"Recommendations from today's MTM visit:                                                    MTM (medication therapy management) is a service provided by a clinical pharmacist designed to help you get the most of out of your medicines.   Today we reviewed what your medicines are for, how to know if they are working, that your medicines are safe and how to make your medicine regimen as easy as possible.      Start injecting Soliqua 15 units daily.  Increase by 2 units every week until fasting blood sugars < 130 mg/dL  Start vitamin D3 2000 units daily    Start checking blood twice daily.  In the morning, make sure to take your blood sugar before eating.  Evening reading, take either before dinner, or 2 hours after eating.     Fasting in the mornin-130 mg/dL, something similar to this reading before meals as well  2-hours after a meal: less than 180 mg/dL    Follow-up: Return in 29 days (on 2024) for Follow up, with MTM Pharmacist.    It was great speaking with you today.  I value your experience and would be very thankful for your time in providing feedback in our clinic survey. In the next few days, you may receive an email or text message from [employer] with a link to a survey related to your  clinical pharmacist.\"     To schedule another MTM appointment, please call the clinic directly or you may call the MTM scheduling line at [phone] or toll-free at [phone].     My Clinical Pharmacist's contact information:                                                      Please feel free to contact me with any questions or concerns you have.      Genny Reilly, PharmD  Pharmacy Resident  Pager #[phone]   "

## 2024-05-16 ENCOUNTER — DOCUMENTATION ONLY (OUTPATIENT)
Dept: PEDIATRICS | Facility: CLINIC | Age: 69
End: 2024-05-16
Payer: COMMERCIAL

## 2024-05-16 NOTE — PROGRESS NOTES
This patient is due for MTM follow-up.     Patient is not reachable after several attempts. We will stop reaching out to the patient at this time. Please let us know if we can assist in this patient's care in the future. Routed to PCP as NASH Talbot, PharmD BCPS  Medication Therapy Management Practitioner  Pharmacist

## 2024-05-21 DIAGNOSIS — E11.29 TYPE 2 DIABETES MELLITUS WITH MICROALBUMINURIA, WITHOUT LONG-TERM CURRENT USE OF INSULIN (H): ICD-10-CM

## 2024-05-21 DIAGNOSIS — R80.9 TYPE 2 DIABETES MELLITUS WITH MICROALBUMINURIA, WITHOUT LONG-TERM CURRENT USE OF INSULIN (H): ICD-10-CM

## 2024-05-21 RX ORDER — METFORMIN HCL 500 MG
TABLET, EXTENDED RELEASE 24 HR ORAL
Qty: 360 TABLET | Refills: 0 | Status: SHIPPED | OUTPATIENT
Start: 2024-05-21 | End: 2024-07-22

## 2024-05-21 RX ORDER — METFORMIN HCL 500 MG
TABLET, EXTENDED RELEASE 24 HR ORAL
Qty: 360 TABLET | Refills: 0 | OUTPATIENT
Start: 2024-05-21

## 2024-05-24 ENCOUNTER — MYC REFILL (OUTPATIENT)
Dept: FAMILY MEDICINE | Facility: CLINIC | Age: 69
End: 2024-05-24
Payer: COMMERCIAL

## 2024-05-24 DIAGNOSIS — R80.9 TYPE 2 DIABETES MELLITUS WITH MICROALBUMINURIA, WITHOUT LONG-TERM CURRENT USE OF INSULIN (H): ICD-10-CM

## 2024-05-24 DIAGNOSIS — E11.29 TYPE 2 DIABETES MELLITUS WITH MICROALBUMINURIA, WITHOUT LONG-TERM CURRENT USE OF INSULIN (H): ICD-10-CM

## 2024-05-28 RX ORDER — LANCETS
EACH MISCELLANEOUS
Qty: 100 EACH | Refills: 6 | OUTPATIENT
Start: 2024-05-28

## 2024-05-28 RX ORDER — METFORMIN HCL 500 MG
TABLET, EXTENDED RELEASE 24 HR ORAL
Qty: 360 TABLET | Refills: 0 | OUTPATIENT
Start: 2024-05-28

## 2024-06-02 ENCOUNTER — HEALTH MAINTENANCE LETTER (OUTPATIENT)
Age: 69
End: 2024-06-02

## 2024-07-12 DIAGNOSIS — E11.9 TYPE 2 DIABETES MELLITUS WITHOUT COMPLICATION, WITHOUT LONG-TERM CURRENT USE OF INSULIN (H): ICD-10-CM

## 2024-07-12 NOTE — TELEPHONE ENCOUNTER
Patient requests medication refill. Injectable medication. Patient has an upcoming appointment with MTM PharmJAUN Regalado but will run out prior to her appointment.

## 2024-07-14 DIAGNOSIS — E11.9 TYPE 2 DIABETES MELLITUS WITHOUT COMPLICATION, WITHOUT LONG-TERM CURRENT USE OF INSULIN (H): ICD-10-CM

## 2024-07-22 ENCOUNTER — LAB (OUTPATIENT)
Dept: LAB | Facility: CLINIC | Age: 69
End: 2024-07-22
Payer: COMMERCIAL

## 2024-07-22 ENCOUNTER — OFFICE VISIT (OUTPATIENT)
Dept: PHARMACY | Facility: CLINIC | Age: 69
End: 2024-07-22
Payer: COMMERCIAL

## 2024-07-22 VITALS
OXYGEN SATURATION: 98 % | DIASTOLIC BLOOD PRESSURE: 72 MMHG | SYSTOLIC BLOOD PRESSURE: 132 MMHG | HEART RATE: 82 BPM | BODY MASS INDEX: 27.7 KG/M2 | WEIGHT: 169 LBS

## 2024-07-22 DIAGNOSIS — E11.9 TYPE 2 DIABETES MELLITUS WITHOUT COMPLICATION, WITHOUT LONG-TERM CURRENT USE OF INSULIN (H): ICD-10-CM

## 2024-07-22 DIAGNOSIS — E11.29 TYPE 2 DIABETES MELLITUS WITH MICROALBUMINURIA, WITHOUT LONG-TERM CURRENT USE OF INSULIN (H): Primary | ICD-10-CM

## 2024-07-22 DIAGNOSIS — R80.9 TYPE 2 DIABETES MELLITUS WITH MICROALBUMINURIA, WITHOUT LONG-TERM CURRENT USE OF INSULIN (H): Primary | ICD-10-CM

## 2024-07-22 DIAGNOSIS — M85.80 OSTEOPENIA, UNSPECIFIED LOCATION: ICD-10-CM

## 2024-07-22 DIAGNOSIS — E78.5 HYPERLIPIDEMIA LDL GOAL <70: ICD-10-CM

## 2024-07-22 DIAGNOSIS — R80.9 TYPE 2 DIABETES MELLITUS WITH MICROALBUMINURIA, WITHOUT LONG-TERM CURRENT USE OF INSULIN (H): ICD-10-CM

## 2024-07-22 DIAGNOSIS — E11.29 TYPE 2 DIABETES MELLITUS WITH MICROALBUMINURIA, WITHOUT LONG-TERM CURRENT USE OF INSULIN (H): ICD-10-CM

## 2024-07-22 LAB — HBA1C MFR BLD: 7.8 % (ref 0–5.6)

## 2024-07-22 PROCEDURE — 82043 UR ALBUMIN QUANTITATIVE: CPT

## 2024-07-22 PROCEDURE — 99607 MTMS BY PHARM ADDL 15 MIN: CPT | Performed by: PHARMACIST

## 2024-07-22 PROCEDURE — 83036 HEMOGLOBIN GLYCOSYLATED A1C: CPT

## 2024-07-22 PROCEDURE — 36415 COLL VENOUS BLD VENIPUNCTURE: CPT

## 2024-07-22 PROCEDURE — 99606 MTMS BY PHARM EST 15 MIN: CPT | Performed by: PHARMACIST

## 2024-07-22 PROCEDURE — 82570 ASSAY OF URINE CREATININE: CPT

## 2024-07-22 RX ORDER — METFORMIN HCL 500 MG
2000 TABLET, EXTENDED RELEASE 24 HR ORAL
Qty: 360 TABLET | Refills: 1 | Status: SHIPPED | OUTPATIENT
Start: 2024-07-22

## 2024-07-22 NOTE — PATIENT INSTRUCTIONS
"Recommendation(s) from today's visit:                                                      Soliqua   increase dose based on blood sugar readings  Increase by 2 units every 4 days if fasting blood sugar are over 130 mg/dL. And only increase if no low blood sugars.     Hypoglycemia:  Symptoms suggestive of hypoglycemia: nervousness, sweating, intense hunger, trembling, weakness, palpitations, and difficulty speaking.     If experiencing symptoms of hypoglycemia or blood sugar is less than 70:  Treat with 15 grams of glucose (sugar), followed by an assessment of symptoms and a blood glucose check if possible. If after 15 minutes there is no improvement, another 10-15 grams should be given. This can be repeated up to three times. The equivalency of 10-15 grams of glucose (approximate servings) are: 4-6 hard candies, 4 teaspoons of sugar, or 1/2 can of regular soda or juice.    Ask pharmacy to fill Lancets.    Labs today    Michelle is going to look your insurance along with help from pharmacy to see your insurance the non-insulin medications.     Follow-up: 3 months with primary care provider; 6 months for another pharmacist visit    My Clinical Pharmacist's contact information:                                                      Michelle Regalado, PharmD  Medication therapy management clinical pharmacist    It was great speaking with you today.  I value your experience and would be very thankful for your time in providing feedback in our clinic survey. In the next few days, you may receive an email or text message from [employer] with a link to a survey related to your  clinical pharmacist.\"     To schedule another MTM appointment, please call the clinic directly [phone] or you may call the MTM scheduling line at [phone].    "

## 2024-07-22 NOTE — PROGRESS NOTES
Medication Therapy Management (MTM) Encounter    ASSESSMENT:                            Medication Adherence/Access: See below for considerations    Osteopenia  Stable, continue with proper calcium and vitamin D supplement. No additional pharmacotherapy indicated based on FRAX score.    Diabetes  Patient is not meeting A1c goal of < 7%.   Microalbumin is not at goal <30mg/g.  Patient would benefit from updated labs. Depending on labs may consider PA for GLP1 RA only product and/or SGLT2i.  Patient is nearly to meeting blood pressure goal of < 130/80mmHg.    Hyperlipidemia:   Patient is not fasting today, plan for recheck with future appointment instead.    PLAN:                            Educated on how to use Accu chek, check completed in clinic as well.    Labs after visit    Soliqua start increasing dose based on blood sugar, 2 units every 4 days if fasting over 130mg/dL.     Discussed hypoglycemia treatement.    Encouraged patient improve diet and exercise.    Depending on lab result may consider SGLT2i and/or change to GLP1 RA only agent.     Follow-up: Return in about 6 months (around 1/22/2025) for Medication Therapy Management Visit.    SUBJECTIVE/OBJECTIVE:                          Suyapa Ware is a 68 year old female seen for a follow-up visit.       Reason for visit: diabetes follow-up.    Allergies/ADRs: Reviewed in chart  Past Medical History: Reviewed in chart  Tobacco: She reports that she quit smoking about 41 years ago. Her smoking use included cigarettes. She started smoking about 50 years ago. She has a 4.5 pack-year smoking history. She has never been exposed to tobacco smoke. She has never used smokeless tobacco.  Alcohol: once every 2 weeks at most    Medication Adherence/Access:   Patient takes medications directly from bottles. Normally takes medications on time. Needs help with glucometer.     Osteopenia:   Vitamin D 50 mcg daily   Patient is not experiencing side effects.  DEXA History:  3/7/2024  Patient previously reported 2-3 serving(s)/day of calcium in their diet.  Risk factors: post-menopausal    Diabetes   Metformin 2000 mg daily  Soliqua 18 units daily - not increased dose because did not know how to monitor blood sugars  Patient is experiencing the following side effects: stomach upset on occasion. Bowel movement have been regular.   Blood sugar monitoring: needs help with monitor, no readings to share for this reason. Checked during visit blood sugar 129, last meal 4 hours ago.  Current diabetes symptoms: none  Diet/Exercise:   Patient is a dietician. She reports knows she has been eating more ice cream than she should. She has been stress eating (stress from home and work life). She has about 3 meals per day.  Patient has a biking machine but admits has not been as active as she should be. She tells me she knows what she needs to work but hasn't been consistent with making these changes. She is disappointed in her weight gain.     Eye exam is up to date  Foot exam: due    Hyperlipidemia   Atorvastatin 20 mg daily  Patient reports no significant myalgias or other side effects.       Today's Vitals: /72   Pulse 82   Wt 169 lb (76.7 kg)   LMP  (LMP Unknown)   SpO2 98%   BMI 27.70 kg/m    ----------------      I spent 30 minutes with this patient today. All changes were made via collaborative practice agreement with JOSHUA Larose CNP. A copy of the visit note was provided to the patient's provider(s).    A summary of these recommendations was given to the patient.    Michelle Regalado, PharmD  Medication Therapy Management Pharmacist     Medication Therapy Recommendations  Type 2 diabetes mellitus with microalbuminuria, without long-term current use of insulin (H)    Current Medication: blood glucose monitoring (NO BRAND SPECIFIED) meter device kit   Rationale: Does not understand instructions - Adherence - Adherence   Recommendation: Provide Education   Status: Patient Agreed -  Adherence/Education         Type 2 diabetes mellitus without complication, without long-term current use of insulin (H)    Current Medication: insulin glargine-lixisenatide (SOLIQUA 100/33) pen   Rationale: Dose too low - Dosage too low - Effectiveness   Recommendation: Increase Dose   Status: Accepted per CPA

## 2024-07-23 LAB
CREAT UR-MCNC: 52.7 MG/DL
MICROALBUMIN UR-MCNC: <12 MG/L
MICROALBUMIN/CREAT UR: NORMAL MG/G{CREAT}

## 2024-07-27 ENCOUNTER — MYC REFILL (OUTPATIENT)
Dept: PEDIATRICS | Facility: CLINIC | Age: 69
End: 2024-07-27
Payer: COMMERCIAL

## 2024-07-27 DIAGNOSIS — E11.9 TYPE 2 DIABETES MELLITUS WITHOUT COMPLICATION, WITHOUT LONG-TERM CURRENT USE OF INSULIN (H): ICD-10-CM

## 2024-08-12 ENCOUNTER — MYC REFILL (OUTPATIENT)
Dept: FAMILY MEDICINE | Facility: CLINIC | Age: 69
End: 2024-08-12
Payer: COMMERCIAL

## 2024-08-12 DIAGNOSIS — R80.9 TYPE 2 DIABETES MELLITUS WITH MICROALBUMINURIA, WITHOUT LONG-TERM CURRENT USE OF INSULIN (H): ICD-10-CM

## 2024-08-12 DIAGNOSIS — E78.5 HYPERLIPIDEMIA LDL GOAL <100: ICD-10-CM

## 2024-08-12 DIAGNOSIS — E11.29 TYPE 2 DIABETES MELLITUS WITH MICROALBUMINURIA, WITHOUT LONG-TERM CURRENT USE OF INSULIN (H): ICD-10-CM

## 2024-08-12 RX ORDER — ATORVASTATIN CALCIUM 20 MG/1
20 TABLET, FILM COATED ORAL DAILY
Qty: 90 TABLET | Refills: 3 | OUTPATIENT
Start: 2024-08-12

## 2024-09-03 ENCOUNTER — MYC REFILL (OUTPATIENT)
Dept: PEDIATRICS | Facility: CLINIC | Age: 69
End: 2024-09-03
Payer: COMMERCIAL

## 2024-09-03 DIAGNOSIS — E11.9 TYPE 2 DIABETES MELLITUS WITHOUT COMPLICATION, WITHOUT LONG-TERM CURRENT USE OF INSULIN (H): ICD-10-CM

## 2024-10-28 DIAGNOSIS — E11.9 TYPE 2 DIABETES MELLITUS WITHOUT COMPLICATION, WITHOUT LONG-TERM CURRENT USE OF INSULIN (H): ICD-10-CM

## 2024-10-28 RX ORDER — INSULIN GLARGINE AND LIXISENATIDE 100; 33 U/ML; UG/ML
INJECTION, SOLUTION SUBCUTANEOUS
Qty: 15 ML | Refills: 3 | Status: SHIPPED | OUTPATIENT
Start: 2024-10-28

## 2024-11-04 ENCOUNTER — PATIENT OUTREACH (OUTPATIENT)
Dept: CARE COORDINATION | Facility: CLINIC | Age: 69
End: 2024-11-04
Payer: COMMERCIAL

## 2024-11-11 ENCOUNTER — MYC REFILL (OUTPATIENT)
Dept: FAMILY MEDICINE | Facility: CLINIC | Age: 69
End: 2024-11-11
Payer: COMMERCIAL

## 2024-11-11 ENCOUNTER — MYC REFILL (OUTPATIENT)
Dept: PEDIATRICS | Facility: CLINIC | Age: 69
End: 2024-11-11
Payer: COMMERCIAL

## 2024-11-11 DIAGNOSIS — E78.5 HYPERLIPIDEMIA LDL GOAL <100: ICD-10-CM

## 2024-11-11 DIAGNOSIS — E11.9 TYPE 2 DIABETES MELLITUS WITHOUT COMPLICATION, WITHOUT LONG-TERM CURRENT USE OF INSULIN (H): ICD-10-CM

## 2024-11-11 DIAGNOSIS — E11.29 TYPE 2 DIABETES MELLITUS WITH MICROALBUMINURIA, WITHOUT LONG-TERM CURRENT USE OF INSULIN (H): ICD-10-CM

## 2024-11-11 DIAGNOSIS — R80.9 TYPE 2 DIABETES MELLITUS WITH MICROALBUMINURIA, WITHOUT LONG-TERM CURRENT USE OF INSULIN (H): ICD-10-CM

## 2024-11-11 RX ORDER — ATORVASTATIN CALCIUM 20 MG/1
20 TABLET, FILM COATED ORAL DAILY
Qty: 90 TABLET | Refills: 3 | OUTPATIENT
Start: 2024-11-11

## 2024-11-19 ENCOUNTER — ALLIED HEALTH/NURSE VISIT (OUTPATIENT)
Dept: RESEARCH | Facility: CLINIC | Age: 69
End: 2024-11-19
Payer: COMMERCIAL

## 2024-11-19 DIAGNOSIS — Z00.6 EXAMINATION OF PARTICIPANT OR CONTROL IN CLINICAL RESEARCH: Primary | ICD-10-CM

## 2024-11-19 PROCEDURE — 99207 PR NO CHARGE-RESEARCH SERVICE: CPT

## 2024-11-19 NOTE — PROGRESS NOTES
Patient signed consented first and then NP discussed patient's colonoscopy. Patient has a screening colonoscopy per the gastroenterologist stated large submucosal lipoma on ileocecal valve, normal overlying mucosa, exam otherwise normal.  NP explained for this study patient would not be able to participate in the study due to this finding.

## 2024-11-19 NOTE — PROGRESS NOTES
[place] Study Consent    Study Description: The purpose of this study is to explore potential relationships between physiologic parameters collected from sensor data with physiological changes potentially induced by the administration of the COVID-19 vaccine.    Suyapa Ware a 69 year old female, was on-site today at [facility] to discuss participation for [place] (-[id number])       The consent form was reviewed with the patient.     The review of the study included:  Study Purpose      Participant Duration, Responsibilities & Expectations    Study Data and Devices    Benefits and Risks of Participation    Compensation and Costs of Participation    Coded Study Data  Voluntary Participation    Study Restrictions  Confidentiality Obligations/Privacy-Related Risks   Injury, Legal, and Data Rights    Authorization to Use and Disclose Your Protected Health Information    Protocol Version: 4.0   Principle Investigator: Frida Hemphill MD    Subject Number: [id number]      The subject was queried in regards to her willingness to continue and her questions were answered to her satisfaction. The patient has given her agreement to volunteer and participate in the above noted study.     The eConsent and HIPAA form version (Version 4.0 Date 09-OCT-2024) was signed on  19-NOV-2024 with the Clinical Research Unit of [facility].     A copy of the [place] consent will be placed in subject's medical record. A copy of the consent form was given to the subject today.    Study data is directly entered into Epic and Integrated [facility] [employer] per protocol. No study procedures were done prior to Suyapa Ware providing informed consent.       19-NOV-2024    Balbina Camargo

## 2024-12-22 ENCOUNTER — HEALTH MAINTENANCE LETTER (OUTPATIENT)
Age: 69
End: 2024-12-22

## 2025-01-07 SDOH — HEALTH STABILITY: PHYSICAL HEALTH: ON AVERAGE, HOW MANY DAYS PER WEEK DO YOU ENGAGE IN MODERATE TO STRENUOUS EXERCISE (LIKE A BRISK WALK)?: 3 DAYS

## 2025-01-07 SDOH — HEALTH STABILITY: PHYSICAL HEALTH: ON AVERAGE, HOW MANY MINUTES DO YOU ENGAGE IN EXERCISE AT THIS LEVEL?: 20 MIN

## 2025-01-07 ASSESSMENT — SOCIAL DETERMINANTS OF HEALTH (SDOH): HOW OFTEN DO YOU GET TOGETHER WITH FRIENDS OR RELATIVES?: THREE TIMES A WEEK

## 2025-01-08 ENCOUNTER — OFFICE VISIT (OUTPATIENT)
Dept: PEDIATRICS | Facility: CLINIC | Age: 70
End: 2025-01-08
Attending: FAMILY MEDICINE
Payer: COMMERCIAL

## 2025-01-08 VITALS
SYSTOLIC BLOOD PRESSURE: 129 MMHG | TEMPERATURE: 97 F | BODY MASS INDEX: 27.68 KG/M2 | WEIGHT: 172.2 LBS | OXYGEN SATURATION: 99 % | HEART RATE: 79 BPM | RESPIRATION RATE: 18 BRPM | DIASTOLIC BLOOD PRESSURE: 80 MMHG | HEIGHT: 66 IN

## 2025-01-08 DIAGNOSIS — Z85.3 HISTORY OF BREAST CANCER: ICD-10-CM

## 2025-01-08 DIAGNOSIS — E66.3 OVERWEIGHT (BMI 25.0-29.9): ICD-10-CM

## 2025-01-08 DIAGNOSIS — Z79.4 TYPE 2 DIABETES MELLITUS WITHOUT COMPLICATION, WITH LONG-TERM CURRENT USE OF INSULIN (H): ICD-10-CM

## 2025-01-08 DIAGNOSIS — E78.5 HYPERLIPIDEMIA LDL GOAL <100: ICD-10-CM

## 2025-01-08 DIAGNOSIS — M54.50 ACUTE RIGHT-SIDED LOW BACK PAIN WITHOUT SCIATICA: ICD-10-CM

## 2025-01-08 DIAGNOSIS — Z00.00 ENCOUNTER FOR PREVENTATIVE ADULT HEALTH CARE EXAMINATION: Primary | ICD-10-CM

## 2025-01-08 DIAGNOSIS — Z12.31 ENCOUNTER FOR SCREENING MAMMOGRAM FOR BREAST CANCER: ICD-10-CM

## 2025-01-08 DIAGNOSIS — E11.9 TYPE 2 DIABETES MELLITUS WITHOUT COMPLICATION, WITH LONG-TERM CURRENT USE OF INSULIN (H): ICD-10-CM

## 2025-01-08 LAB
ALBUMIN SERPL BCG-MCNC: 3.9 G/DL (ref 3.5–5.2)
ALP SERPL-CCNC: 69 U/L (ref 40–150)
ALT SERPL W P-5'-P-CCNC: 26 U/L (ref 0–50)
ANION GAP SERPL CALCULATED.3IONS-SCNC: 12 MMOL/L (ref 7–15)
AST SERPL W P-5'-P-CCNC: 22 U/L (ref 0–45)
BILIRUB SERPL-MCNC: 0.3 MG/DL
BUN SERPL-MCNC: 12.1 MG/DL (ref 8–23)
CALCIUM SERPL-MCNC: 10.3 MG/DL (ref 8.8–10.4)
CHLORIDE SERPL-SCNC: 103 MMOL/L (ref 98–107)
CHOLEST SERPL-MCNC: 130 MG/DL
CREAT SERPL-MCNC: 0.64 MG/DL (ref 0.51–0.95)
CREAT UR-MCNC: 162 MG/DL
EGFRCR SERPLBLD CKD-EPI 2021: >90 ML/MIN/1.73M2
EST. AVERAGE GLUCOSE BLD GHB EST-MCNC: 183 MG/DL
FASTING STATUS PATIENT QL REPORTED: NO
FASTING STATUS PATIENT QL REPORTED: NO
GLUCOSE SERPL-MCNC: 185 MG/DL (ref 70–99)
HBA1C MFR BLD: 8 % (ref 0–5.6)
HCO3 SERPL-SCNC: 23 MMOL/L (ref 22–29)
HDLC SERPL-MCNC: 37 MG/DL
LDLC SERPL CALC-MCNC: 57 MG/DL
MICROALBUMIN UR-MCNC: 46.7 MG/L
MICROALBUMIN/CREAT UR: 28.83 MG/G CR (ref 0–25)
NONHDLC SERPL-MCNC: 93 MG/DL
POTASSIUM SERPL-SCNC: 4.4 MMOL/L (ref 3.4–5.3)
PROT SERPL-MCNC: 6.5 G/DL (ref 6.4–8.3)
SODIUM SERPL-SCNC: 138 MMOL/L (ref 135–145)
TRIGL SERPL-MCNC: 181 MG/DL
TSH SERPL DL<=0.005 MIU/L-ACNC: 3.56 UIU/ML (ref 0.3–4.2)

## 2025-01-08 PROCEDURE — 84443 ASSAY THYROID STIM HORMONE: CPT | Performed by: NURSE PRACTITIONER

## 2025-01-08 PROCEDURE — G2211 COMPLEX E/M VISIT ADD ON: HCPCS | Performed by: NURSE PRACTITIONER

## 2025-01-08 PROCEDURE — 82570 ASSAY OF URINE CREATININE: CPT | Performed by: NURSE PRACTITIONER

## 2025-01-08 PROCEDURE — 99397 PER PM REEVAL EST PAT 65+ YR: CPT | Performed by: NURSE PRACTITIONER

## 2025-01-08 PROCEDURE — 83036 HEMOGLOBIN GLYCOSYLATED A1C: CPT | Performed by: NURSE PRACTITIONER

## 2025-01-08 PROCEDURE — 82043 UR ALBUMIN QUANTITATIVE: CPT | Performed by: NURSE PRACTITIONER

## 2025-01-08 PROCEDURE — 36415 COLL VENOUS BLD VENIPUNCTURE: CPT | Performed by: NURSE PRACTITIONER

## 2025-01-08 PROCEDURE — 99214 OFFICE O/P EST MOD 30 MIN: CPT | Mod: 25 | Performed by: NURSE PRACTITIONER

## 2025-01-08 PROCEDURE — 80061 LIPID PANEL: CPT | Performed by: NURSE PRACTITIONER

## 2025-01-08 PROCEDURE — 80053 COMPREHEN METABOLIC PANEL: CPT | Performed by: NURSE PRACTITIONER

## 2025-01-08 ASSESSMENT — PAIN SCALES - GENERAL: PAINLEVEL_OUTOF10: NO PAIN (1)

## 2025-01-08 NOTE — PROGRESS NOTES
Called pt and advised pt our  reviewed her chart and everything was coded and billed correctly. Pt ststes that the CPT code per her insurance is a diagnostic code. Encouraged pt to follow up with her insurance or our billing dept. She states that she spoke to her insurance today and already spoke with billing.     Advised pt I would follow up with our  and call her back with further info.    Preventive Care Visit  [facility] JOSHUA Farnsworth CNP, Family Medicine  Jan 8, 2025      Assessment & Plan     Encounter for preventative adult health care examination  Age appropriate screening and preventative care have been addressed today. Vaccinations have been reviewed and are up to date with exception of COVID vaccine which she declines today. Patient has been advised to undertake routine aerobic activity and they were counseled on healthy weight maintenance. Recommend annual vision exams as well as biannual dental exams. They will follow up for annual physical again in one year.   - Colonoscopy reportedly up to date, done at [facility] in 2021 with normal results.  - Mammo - due, ordered  - Pap - last pap in 2021 with negative co-testing, prior pap in 2018 with cytology only. One more co-testing would be indicated prior to discontinuing screening. Discussed risks and benefits, she declines today.     History of breast cancer  Encounter for screening mammogram for breast cancer  - MA Screen Bilateral w/Roberto; Future    Overweight (BMI 25.0-29.9)  Body mass index is 27.79 kg/m . Admits she has gained some weight since her last visit, got off track with her diet. Not currently exercising outside of work but has a stationary bike at home. She is feeling optimistic about getting back into a routine.     Type 2 diabetes mellitus without complication, with long-term current use of insulin (H)  Chronic, stable. Following with MTM, has upcoming visit 1/21 with lab visit prior. Can't bring herself to check her BS for reasons that are not entirely clear. She is taking metformin and soliqua as prescribed, no concerns. She follows with an eye doctor for routine exams.   - Albumin Random Urine Quantitative with Creat Ratio; Future  - Albumin Random Urine Quantitative with Creat Ratio    Hyperlipidemia LDL goal <100  Chronic, stable. Taking atorvastatin as prescribed. Will be checking fasting labs on  "1/21.    Acute right-sided low back pain without sciatica  No known injury. No red flag symptoms. Offered PT referral but she declined, would like to continue to monitor for now. Will follow-up if worsening.            BMI  Estimated body mass index is 27.79 kg/m  as calculated from the following:    Height as of this encounter: 1.676 m (5' 6\").    Weight as of this encounter: 78.1 kg (172 lb 3.2 oz).   Weight management plan: Discussed healthy diet and exercise guidelines    Counseling  Appropriate preventive services were addressed with this patient via screening, questionnaire, or discussion as appropriate for fall prevention, nutrition, physical activity, Tobacco-use cessation, social engagement, weight loss and cognition.  Checklist reviewing preventive services available has been given to the patient.  Reviewed patient's diet, addressing concerns and/or questions.   She is at risk for lack of exercise and has been provided with information to increase physical activity for the benefit of her well-being.   She is at risk for psychosocial distress and has been provided with information to reduce risk.   Discussed possible causes of fatigue.       Sharifa Dale is a 69 year old, presenting for the following:  Physical        1/8/2025     7:07 AM   Additional Questions   Roomed by Michelle BILLINGS   Accompanied by None         1/8/2025     7:07 AM   Patient Reported Additional Medications   Patient reports taking the following new medications None        Via the Health Maintenance questionnaire, the patient has reported the following services have been completed -Eye Exam: [facility] 2024-02-24, this information has been sent to the abstraction team.    HPI    Pain in right lower back getting to 7/10 pain. Localized, sharp, intermittent. No known injury. 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     Mother moved in with her in November.     Can't bring herself to check " her BS. Admits she has gained some weight since her last visit. Not currently exercising outside of work but has a stationary bike at home. Has been compliant with her diabetes medications.     Health Care Directive  Patient does not have a Health Care Directive: Discussed advance care planning with patient; information given to patient to review.      1/7/2025   General Health   How would you rate your overall physical health? Excellent   Feel stress (tense, anxious, or unable to sleep) To some extent   (!) STRESS CONCERN      1/7/2025   Nutrition   Diet: Regular (no restrictions)         1/7/2025   Exercise   Days per week of moderate/strenous exercise 3 days   Average minutes spent exercising at this level 20 min         1/7/2025   Social Factors   Frequency of gathering with friends or relatives Three times a week   Worry food won't last until get money to buy more No   Food not last or not have enough money for food? No   Do you have housing? (Housing is defined as stable permanent housing and does not include staying ouside in a car, in a tent, in an abandoned building, in an overnight shelter, or couch-surfing.) Yes   Are you worried about losing your housing? No   Lack of transportation? No   Unable to get utilities (heat,electricity)? No         1/7/2025   Fall Risk   Fallen 2 or more times in the past year? No    No   Trouble with walking or balance? No    No       Multiple values from one day are sorted in reverse-chronological order          1/7/2025   Activities of Daily Living- Home Safety   Needs help with the following daily activites None of the above   Safety concerns in the home None of the above         1/7/2025   Dental   Dentist two times every year? Yes         1/7/2025   Hearing Screening   Hearing concerns? None of the above         1/7/2025   Driving Risk Screening   Patient/family members have concerns about driving No         1/7/2025   General Alertness/Fatigue Screening   Have you been  more tired than usual lately? (!) YES         2025   Urinary Incontinence Screening   Bothered by leaking urine in past 6 months No         2025   TB Screening   Were you born outside of the [place]? No         Today's PHQ-2 Score:       2025     8:23 PM   PHQ-2 (  [employer])   Q1: Little interest or pleasure in doing things 0   Q2: Feeling down, depressed or hopeless 0   PHQ-2 Score 0    Q1: Little interest or pleasure in doing things Not at all   Q2: Feeling down, depressed or hopeless Not at all   PHQ-2 Score 0       Patient-reported           2025   Substance Use   Alcohol more than 3/day or more than 7/wk No   Do you have a current opioid prescription? No   How severe/bad is pain from 1 to 10? 4/10   Do you use any other substances recreationally? No     Social History     Tobacco Use    Smoking status: Former     Current packs/day: 0.00     Average packs/day: 0.5 packs/day for 9.0 years (4.5 ttl pk-yrs)     Types: Cigarettes     Start date: 1974     Quit date: 1983     Years since quittin.0     Passive exposure: Never    Smokeless tobacco: Never   Vaping Use    Vaping status: Never Used   Substance Use Topics    Alcohol use: Yes     Comment: occasional    Drug use: No           2024   LAST FHS-7 RESULTS   1st degree relative breast or ovarian cancer No   Any relative bilateral breast cancer No   Any male have breast cancer No   Any ONE woman have BOTH breast AND ovarian cancer No   Any woman with breast cancer before 50yrs No   2 or more relatives with breast AND/OR ovarian cancer No   2 or more relatives with breast AND/OR bowel cancer No        Mammogram Screening - Mammogram every 1-2 years updated in Health Maintenance based on mutual decision making      History of abnormal Pap smear: No - age 65 or older with pap indicated due to inadequate prior screening (3 consecutive negative cytology results, 2 consecutive negative cotesting results, or 2 consecutive negative HrHPV test  results within 10 years, with the most recent test occurring within the recommended screening interval for the test used)       ASCVD Risk   The 10-year ASCVD risk score (Susie DK, et al., 2019) is: 18.1%    Values used to calculate the score:      Age: 69 years      Sex: Female      Is Non- : No      Diabetic: Yes      Tobacco smoker: No      Systolic Blood Pressure: 129 mmHg      Is BP treated: No      HDL Cholesterol: 54 mg/dL      Total Cholesterol: 285 mg/dL            Reviewed and updated as needed this visit by Provider                    Patient Active Problem List   Diagnosis    History of breast cancer    Type 2 diabetes mellitus without complication, without long-term current use of insulin (H)    Osteopenia     Past Surgical History:   Procedure Laterality Date    BIOPSY BREAST NEEDLE LOCALIZATION  2012    Procedure: BIOPSY BREAST NEEDLE LOCALIZATION;  Left Breast Wire Localized Excisional Biopsy ;  Surgeon: Festus Solano MD;  Location: [facility] OR    BIOPSY OF SKIN LESION      face, [place]    wisdom teeth[         Social History     Tobacco Use    Smoking status: Former     Current packs/day: 0.00     Average packs/day: 0.5 packs/day for 9.0 years (4.5 ttl pk-yrs)     Types: Cigarettes     Start date: 1974     Quit date: 1983     Years since quittin.0     Passive exposure: Never    Smokeless tobacco: Never   Substance Use Topics    Alcohol use: Yes     Comment: occasional     Family History   Problem Relation Age of Onset    Memory loss Mother     Lung Cancer Mother 89    Memory loss Father     Alzheimer Disease Father          Current providers sharing in care for this patient include:  Patient Care Team:  Rebecca Masterson APRN CNP as PCP - General (Family Medicine)  Maribell Serrato MD as Assigned PCP  Cata Talbot [facility] as Pharmacist (Pharmacist)  Michelle Regalado [facility] as Pharmacist (Pharmacist)  Michelle Regalado [facility]  "as Assigned MTM Pharmacist    The following health maintenance items are reviewed in Epic and correct as of today:  Health Maintenance   Topic Date Due    DIABETIC FOOT EXAM  Never done    COVID-19 Vaccine (6 - 2024-25 season) 09/01/2024    EYE EXAM  10/03/2024    A1C  10/22/2024    MEDICARE ANNUAL WELLNESS VISIT  12/04/2024    ANNUAL REVIEW OF HM ORDERS  12/04/2024    BMP  02/06/2025    LIPID  02/06/2025    MICROALBUMIN  07/22/2025    FALL RISK ASSESSMENT  01/08/2026    MAMMO SCREENING  01/16/2026    DTAP/TDAP/TD IMMUNIZATION (4 - Td or Tdap) 11/20/2028    ADVANCE CARE PLANNING  01/08/2030    COLORECTAL CANCER SCREENING  12/13/2031    DEXA  03/07/2039    HEPATITIS C SCREENING  Completed    PHQ-2 (once per calendar year)  Completed    INFLUENZA VACCINE  Completed    Pneumococcal Vaccine: 50+ Years  Completed    ZOSTER IMMUNIZATION  Completed    RSV VACCINE  Completed    HPV IMMUNIZATION  Aged Out    MENINGITIS IMMUNIZATION  Aged Out    RSV MONOCLONAL ANTIBODY  Aged Out    PAP  Discontinued            Objective    Exam  /80 (BP Location: Right arm, Patient Position: Sitting, Cuff Size: Adult Regular)   Pulse 79   Temp 97  F (36.1  C) (Tympanic)   Resp 18   Ht 1.676 m (5' 6\")   Wt 78.1 kg (172 lb 3.2 oz)   LMP  (LMP Unknown)   SpO2 99%   BMI 27.79 kg/m     Estimated body mass index is 27.79 kg/m  as calculated from the following:    Height as of this encounter: 1.676 m (5' 6\").    Weight as of this encounter: 78.1 kg (172 lb 3.2 oz).    Physical Exam  Constitutional: appears to be in no acute distress, comfortable, pleasant.   Eyes: anicteric, conjunctiva clear without drainage or erythema. BRIDGETTE.   Ears, Nose and Throat: tympanic membranes gray with LR,  nose without nasal discharge. OP: no erythema to posterior pharynx, negative post nasal drainage, tonsils +1 no erythema or exudate.  Neck: supple, thyroid palpable,not enlarged, no nodules   Breast: Exam deferred (deferred after discussion of exam " options with patient, no symptoms or concerns).   Cardiovascular: regular rate and rhythm, normal S1 and S2, no murmurs, rubs or gallops, peripheral pulses full and symmetric; negative peripheral edema   Respiratory: Air entry throughout. Breathing pattern unlabored without the use of accessory muscles. Clear to auscultation A and P, no wheezes or crackles, normal breath sounds.    Gastrointestinal: rounded, soft. Positive bowel sounds x4, nontender, no masses.   Genitourinary: Exam deferred (deferred after discussion of exam options with patient, no symptoms or concerns, declines pap).   Musculoskeletal: full range of motion, no edema.   Skin: pink, turgor smooth and elastic. Negative for lesions or dryness.  Neurological: normal gait, no tremor.   Psychological: appropriate mood and affect.   Lymphatic: no cervical, axillary, supraclavicular, or infraclavicular lymphadenopathy.           1/8/2025   Mini Cog   Clock Draw Score 2 Normal   3 Item Recall 3 objects recalled   Mini Cog Total Score 5             Signed Electronically by: JOSHUA Larose CNP

## 2025-01-13 ENCOUNTER — DOCUMENTATION ONLY (OUTPATIENT)
Dept: LAB | Facility: CLINIC | Age: 70
End: 2025-01-13
Payer: COMMERCIAL

## 2025-01-13 DIAGNOSIS — E11.29 TYPE 2 DIABETES MELLITUS WITH MICROALBUMINURIA, WITHOUT LONG-TERM CURRENT USE OF INSULIN (H): ICD-10-CM

## 2025-01-13 DIAGNOSIS — E78.5 HYPERLIPIDEMIA LDL GOAL <100: ICD-10-CM

## 2025-01-13 DIAGNOSIS — Z13.220 LIPID SCREENING: Primary | ICD-10-CM

## 2025-01-13 DIAGNOSIS — R80.9 TYPE 2 DIABETES MELLITUS WITH MICROALBUMINURIA, WITHOUT LONG-TERM CURRENT USE OF INSULIN (H): ICD-10-CM

## 2025-01-13 NOTE — PROGRESS NOTES
Suyapa Bashirer has an upcoming lab appointment:    Future Appointments   Date Time Provider Department Center   1/21/2025  8:45 AM  LAB [facility]    1/21/2025  4:00 PM [facility]   [facility]  4[email or web address]     Patient is scheduled for the following lab(s):   Scheduling Notes: Fasting per H. Balfanz   Made On: 1/8/2025 9:56 AM By: SHERRIE BLEDSOE     There is no order available. Please review and place either future orders or HMPO (Review of Health Maintenance Protocol Orders), as appropriate.    Health Maintenance Due   Topic    ANNUAL REVIEW OF HM ORDERS      She had done blood draw on 01/08/25. She said provider needed fasting labs redraw. Could you please review? Thank you   Estrellita WOODSON

## 2025-01-20 NOTE — PROGRESS NOTES
Medication Therapy Management (MTM) Encounter    ASSESSMENT:                            Medication Adherence/Access: No issues identified.    Diabetes  Patient is not meeting A1c goal of < 7%. Recommend changing from combo insulin+glp1RA to tirzepatide for a1c control and likely to improve her appetite better.  Recommend continuous glucose monitor to help with monitoring.    Hyperlipidemia   TG elevated but < 500, continue with lifestyle changes.    Bone Health - Osteopenia  Suggest she increase calcium    PLAN:                            Continuous glucose monitor Libre3+    Change Soliqua to Mounjaro.    Follow-up: Return in about 10 weeks (around 4/1/2025) for Medication Therapy Management Visit.    SUBJECTIVE/OBJECTIVE:                          Suyapa Ware is a 69 year old female seen for a follow-up visit.       Reason for visit: diabetes follow-up.    Allergies/ADRs: Reviewed in chart  Past Medical History: Reviewed in chart  Tobacco: She reports that she quit smoking about 42 years ago. Her smoking use included cigarettes. She started smoking about 51 years ago. She has a 4.5 pack-year smoking history. She has never been exposed to tobacco smoke. She has never used smokeless tobacco.  Alcohol: 1-3 beverages / week at most    Medication Adherence/Access: The patient fills medications at [place]: NO, fills medications at [facility].    Diabetes   Metformin 2000 mg daily  Soliqua 18-20 units daily   No side effects.  Patient [facility] program [facility]. She thought should we retire last year but has not. her mom just moved in with her in November which has been an adjustment. Her mom likes to snack on cookies at night so she admits has increased cookies which she knows she should not be.   Patient has a biking machine     Blood sugar monitoring: never, can't get herself to do it. Wonders what other methods are possible.  Eye exam is up to date  Foot exam: due  Estimated body mass index is 27.92 kg/m  as  "calculated from the following:    Height as of 1/8/25: 5' 6\" (1.676 m).    Weight as of this encounter: 173 lb (78.5 kg).    Hyperlipidemia   Atorvastatin 20 mg daily  No issues     Bone Health - Osteopenia  Vitamin D 2000 units daily  Patient is not experiencing side effects.  DEXA History: 3/2024  Patient is getting  < 3 servings of calcium per day .         Today's Vitals: /68   Wt 173 lb (78.5 kg)   LMP  (LMP Unknown)   BMI 27.92 kg/m    ----------------      I spent 30 minutes with this patient today. All changes were made via collaborative practice agreement with JOSHUA Larose CNP.     A summary of these recommendations was given to the patient.    Michelle Regalado, PharmD  Medication Therapy Management Pharmacist       Medication Therapy Recommendations  Type 2 diabetes mellitus with microalbuminuria, without long-term current use of insulin (H)   1 Current Medication: Continuous Glucose Sensor (FREESTYLE PETER 3 PLUS SENSOR) MISC   Current Medication Sig: Change every 15 days.   Rationale: Synergistic therapy - Needs additional medication therapy - Indication   Recommendation: Start Medication   Status: Accepted per CPA   Identified Date: 1/21/2025 Completed Date: 1/21/2025         2 Current Medication: insulin glargine-lixisenatide (SOLIQUA) pen (Discontinued)   Current Medication Sig: INJECT 15-30 UNITS IN THE MORNING BEFORE BREAKFAST, INCREASE BY 2 UNITS EVERY WEEK(MAX 30 UNITS DAILY) UNTIL FASTING BLOOD SUGARS< 130MG/DL   Rationale: More effective medication available - Ineffective medication - Effectiveness   Recommendation: Change Medication   Status: Accepted per CPA   Identified Date: 1/21/2025 Completed Date: 1/21/2025            "

## 2025-01-21 ENCOUNTER — LAB (OUTPATIENT)
Dept: LAB | Facility: CLINIC | Age: 70
End: 2025-01-21
Payer: COMMERCIAL

## 2025-01-21 ENCOUNTER — OFFICE VISIT (OUTPATIENT)
Dept: PHARMACY | Facility: CLINIC | Age: 70
End: 2025-01-21
Payer: COMMERCIAL

## 2025-01-21 VITALS — WEIGHT: 173 LBS | DIASTOLIC BLOOD PRESSURE: 68 MMHG | BODY MASS INDEX: 27.92 KG/M2 | SYSTOLIC BLOOD PRESSURE: 118 MMHG

## 2025-01-21 DIAGNOSIS — M85.80 OSTEOPENIA, UNSPECIFIED LOCATION: ICD-10-CM

## 2025-01-21 DIAGNOSIS — E11.29 TYPE 2 DIABETES MELLITUS WITH MICROALBUMINURIA, WITHOUT LONG-TERM CURRENT USE OF INSULIN (H): ICD-10-CM

## 2025-01-21 DIAGNOSIS — R80.9 TYPE 2 DIABETES MELLITUS WITH MICROALBUMINURIA, WITHOUT LONG-TERM CURRENT USE OF INSULIN (H): Primary | ICD-10-CM

## 2025-01-21 DIAGNOSIS — E11.29 TYPE 2 DIABETES MELLITUS WITH MICROALBUMINURIA, WITHOUT LONG-TERM CURRENT USE OF INSULIN (H): Primary | ICD-10-CM

## 2025-01-21 DIAGNOSIS — E78.5 HYPERLIPIDEMIA LDL GOAL <70: ICD-10-CM

## 2025-01-21 DIAGNOSIS — R80.9 TYPE 2 DIABETES MELLITUS WITH MICROALBUMINURIA, WITHOUT LONG-TERM CURRENT USE OF INSULIN (H): ICD-10-CM

## 2025-01-21 DIAGNOSIS — E78.5 HYPERLIPIDEMIA LDL GOAL <100: ICD-10-CM

## 2025-01-21 LAB
CHOLEST SERPL-MCNC: 141 MG/DL
FASTING STATUS PATIENT QL REPORTED: YES
HDLC SERPL-MCNC: 46 MG/DL
LDLC SERPL CALC-MCNC: 67 MG/DL
NONHDLC SERPL-MCNC: 95 MG/DL
TRIGL SERPL-MCNC: 140 MG/DL

## 2025-01-21 PROCEDURE — 99607 MTMS BY PHARM ADDL 15 MIN: CPT | Performed by: PHARMACIST

## 2025-01-21 PROCEDURE — 36415 COLL VENOUS BLD VENIPUNCTURE: CPT

## 2025-01-21 PROCEDURE — 99606 MTMS BY PHARM EST 15 MIN: CPT | Performed by: PHARMACIST

## 2025-01-21 PROCEDURE — 80061 LIPID PANEL: CPT

## 2025-01-21 RX ORDER — HYDROCHLOROTHIAZIDE 12.5 MG/1
CAPSULE ORAL
Qty: 6 EACH | Refills: 1 | Status: SHIPPED | OUTPATIENT
Start: 2025-01-21

## 2025-01-21 NOTE — PATIENT INSTRUCTIONS
"Recommendation(s) from today's visit:                                                      [facility] [phone]    Change Soliqua to Mounjaro.  Use up the soliqua at dose of 20 units daily.  Then when out of Soliqua start mounjaro 2.5 mg subcutaneous weekly for 1 month, if tolerating then increase to 5 mg weekly.   (If you miss a dose, you have a 4 day window to get that dose in right)    Goal 1200 mg calcium from diet or supplement    Start continuous glucose monitor Libre3+  Please download freestyle faustino 3  2. Connect to the Clinic on the Faust[facility]id.  Step 1: Open the Settings Menu. Click the three blue lines to open the Settings Menu.  Step 2: Click Connected Apps.  Step 3: Click Connect or Manage next to [employer].  Step 4: Clinic Connect to a Practice:  Step 5: Link to a practice: To connect to [facility] enter the following Practice ID: [id number]  in the field provided and click the Add button.         Follow-up:     My Clinical Pharmacist's contact information:                                                      Michelle Regalado, PharmD  Medication therapy management clinical pharmacist    It was great speaking with you today.  I value your experience and would be very thankful for your time in providing feedback in our clinic survey. In the next few days, you may receive an email or text message from [employer] with a link to a survey related to your  clinical pharmacist.\"     To schedule another MTM appointment, please call the clinic directly [phone] or you may call the MTM scheduling line at [phone].    "

## 2025-01-28 ENCOUNTER — ANCILLARY PROCEDURE (OUTPATIENT)
Dept: MAMMOGRAPHY | Facility: CLINIC | Age: 70
End: 2025-01-28
Attending: NURSE PRACTITIONER
Payer: COMMERCIAL

## 2025-01-28 DIAGNOSIS — Z12.31 ENCOUNTER FOR SCREENING MAMMOGRAM FOR BREAST CANCER: ICD-10-CM

## 2025-01-28 PROCEDURE — 77063 BREAST TOMOSYNTHESIS BI: CPT | Mod: TC | Performed by: RADIOLOGY

## 2025-01-28 PROCEDURE — 77067 SCR MAMMO BI INCL CAD: CPT | Mod: TC | Performed by: RADIOLOGY

## 2025-02-08 DIAGNOSIS — E78.5 HYPERLIPIDEMIA LDL GOAL <100: ICD-10-CM

## 2025-02-08 DIAGNOSIS — R80.9 TYPE 2 DIABETES MELLITUS WITH MICROALBUMINURIA, WITHOUT LONG-TERM CURRENT USE OF INSULIN (H): ICD-10-CM

## 2025-02-08 DIAGNOSIS — E11.29 TYPE 2 DIABETES MELLITUS WITH MICROALBUMINURIA, WITHOUT LONG-TERM CURRENT USE OF INSULIN (H): ICD-10-CM

## 2025-02-10 RX ORDER — ATORVASTATIN CALCIUM 20 MG/1
20 TABLET, FILM COATED ORAL DAILY
Qty: 90 TABLET | Refills: 0 | Status: SHIPPED | OUTPATIENT
Start: 2025-02-10

## 2025-02-11 ENCOUNTER — MYC REFILL (OUTPATIENT)
Dept: FAMILY MEDICINE | Facility: CLINIC | Age: 70
End: 2025-02-11
Payer: COMMERCIAL

## 2025-02-11 ENCOUNTER — MYC MEDICAL ADVICE (OUTPATIENT)
Dept: PEDIATRICS | Facility: CLINIC | Age: 70
End: 2025-02-11
Payer: COMMERCIAL

## 2025-02-11 ENCOUNTER — MYC REFILL (OUTPATIENT)
Dept: PHARMACY | Facility: CLINIC | Age: 70
End: 2025-02-11
Payer: COMMERCIAL

## 2025-02-11 DIAGNOSIS — E11.29 TYPE 2 DIABETES MELLITUS WITH MICROALBUMINURIA, WITHOUT LONG-TERM CURRENT USE OF INSULIN (H): ICD-10-CM

## 2025-02-11 DIAGNOSIS — E78.5 HYPERLIPIDEMIA LDL GOAL <100: ICD-10-CM

## 2025-02-11 DIAGNOSIS — R80.9 TYPE 2 DIABETES MELLITUS WITH MICROALBUMINURIA, WITHOUT LONG-TERM CURRENT USE OF INSULIN (H): ICD-10-CM

## 2025-02-11 RX ORDER — HYDROCHLOROTHIAZIDE 12.5 MG/1
CAPSULE ORAL
Qty: 6 EACH | Refills: 1 | OUTPATIENT
Start: 2025-02-11

## 2025-02-11 RX ORDER — ATORVASTATIN CALCIUM 20 MG/1
20 TABLET, FILM COATED ORAL DAILY
Qty: 90 TABLET | Refills: 0 | OUTPATIENT
Start: 2025-02-11

## 2025-02-11 NOTE — TELEPHONE ENCOUNTER
Disp Refills Start End AKASH   atorvastatin (LIPITOR) 20 MG tablet 90 tablet 0 2/10/2025 -- No   Sig - Route: TAKE 1 TABLET(20 MG) BY MOUTH DAILY - Oral   Sent to pharmacy as: Atorvastatin Calcium 20 MG Oral Tablet (LIPITOR)   Class: E-Prescribe   Order: [id number]   E-Prescribing Status: Receipt confirmed by pharmacy (2/10/2025  8:38 AM CST)       Rajni MURILLO RN on 2/11/2025 at 12:42 PM

## 2025-03-10 ENCOUNTER — OFFICE VISIT (OUTPATIENT)
Dept: URGENT CARE | Facility: URGENT CARE | Age: 70
End: 2025-03-10
Payer: COMMERCIAL

## 2025-03-10 VITALS
OXYGEN SATURATION: 97 % | BODY MASS INDEX: 28.18 KG/M2 | WEIGHT: 174.6 LBS | HEART RATE: 78 BPM | RESPIRATION RATE: 16 BRPM | DIASTOLIC BLOOD PRESSURE: 84 MMHG | SYSTOLIC BLOOD PRESSURE: 136 MMHG | TEMPERATURE: 97.9 F

## 2025-03-10 DIAGNOSIS — H65.91 OME (OTITIS MEDIA WITH EFFUSION), RIGHT: Primary | ICD-10-CM

## 2025-03-10 DIAGNOSIS — J01.90 ACUTE SINUSITIS WITH SYMPTOMS > 10 DAYS: ICD-10-CM

## 2025-03-10 PROCEDURE — 99213 OFFICE O/P EST LOW 20 MIN: CPT | Performed by: PHYSICIAN ASSISTANT

## 2025-03-10 PROCEDURE — 3075F SYST BP GE 130 - 139MM HG: CPT | Performed by: PHYSICIAN ASSISTANT

## 2025-03-10 PROCEDURE — 3079F DIAST BP 80-89 MM HG: CPT | Performed by: PHYSICIAN ASSISTANT

## 2025-03-10 RX ORDER — AMOXICILLIN 875 MG/1
875 TABLET, COATED ORAL 2 TIMES DAILY
Qty: 20 TABLET | Refills: 0 | Status: SHIPPED | OUTPATIENT
Start: 2025-03-10 | End: 2025-03-20

## 2025-03-10 NOTE — PROGRESS NOTES
SUBJECTIVE:  Suyapa Ware is a 69 year old female who comes in with concerns for possible right-sided otitis media.  Patient states that she has had cold symptoms for a few weeks that she has been sinus infection.  Using over-the-counter meds for symptomatic relief.  Last week she developed some pain on the left side in the upper dental region.  She feels that this was due to a temporary crown that she is getting replaced in approximately 1 week.  Now over the past several days she is having pain in her right ear that is getting worse.  Has been taking ibuprofen.  Denies any high fevers during this time.  No shortness of breath or chest pain.  She is otherwise at baseline health    Past Medical History:   Diagnosis Date    AK (actinic keratosis) 2012    face    Diabetes mellitus, type 2 (H)     Hyperlipidemia LDL goal <100     Postmenopausal bleeding     US showed fibroids, neg endometrial biopsy     Patient Active Problem List   Diagnosis    History of breast cancer    Type 2 diabetes mellitus without complication, without long-term current use of insulin (H)    Osteopenia     Current Outpatient Medications   Medication Sig Dispense Refill    atorvastatin (LIPITOR) 20 MG tablet TAKE 1 TABLET(20 MG) BY MOUTH DAILY 90 tablet 0    Continuous Glucose Sensor (FREESTYLE PETER 3 PLUS SENSOR) MISC Change every 15 days. 6 each 1    metFORMIN (GLUCOPHAGE XR) 500 MG 24 hr tablet Take 4 tablets (2,000 mg) by mouth daily (with dinner). 360 tablet 1    vitamin D3 (CHOLECALCIFEROL) 50 mcg (2000 units) tablet Take 1 tablet by mouth daily       No current facility-administered medications for this visit.     Social History     Socioeconomic History    Marital status:      Spouse name: Not on file    Number of children: Not on file    Years of education: Not on file    Highest education level: Not on file   Occupational History    Not on file   Tobacco Use    Smoking status: Former     Current packs/day: 0.00     Average  packs/day: 0.5 packs/day for 9.0 years (4.5 ttl pk-yrs)     Types: Cigarettes     Start date: 1974     Quit date: 1983     Years since quittin.2     Passive exposure: Never    Smokeless tobacco: Never   Vaping Use    Vaping status: Never Used   Substance and Sexual Activity    Alcohol use: Yes     Comment: occasional    Drug use: No    Sexual activity: Not Currently   Other Topics Concern    Not on file   Social History Narrative    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     Lives alone in a townhouse in [place].     Has two girls, ages 35 tino and 27 margarito. Her oldest has two boys and they live in [place]. Her other dtr is single, lives in [place]. Her mother is 91 and lives in [place], [place]. She lives in an apartment with lots of help from friends.     Hobbies: scrapbooking, [place] [employer], enjoys traveling, active in her [employer].         Rebecca Masterson, DNP, APRN, CNP    24     Social Drivers of Health     Financial Resource Strain: Low Risk  (2025)    Financial Resource Strain     Within the past 12 months, have you or your family members you live with been unable to get utilities (heat, electricity) when it was really needed?: No   Food Insecurity: Low Risk  (2025)    Food Insecurity     Within the past 12 months, did you worry that your food would run out before you got money to buy more?: No     Within the past 12 months, did the food you bought just not last and you didn t have money to get more?: No   Transportation Needs: Low Risk  (2025)    Transportation Needs     Within the past 12 months, has lack of transportation kept you from medical appointments, getting your medicines, non-medical meetings or appointments, work, or from getting things that you need?: No   Physical Activity: Insufficiently Active (2025)    Exercise Vital Sign     Days of Exercise per Week: 3 days     Minutes of Exercise per Session: 20 min   Stress: Stress Concern Present (2025)    [place]  [employer] - Occupational Stress Questionnaire     Feeling of Stress : To some extent   Social Connections: Unknown (1/7/2025)    Social Connection and Isolation Panel [NHANES]     Frequency of Communication with Friends and Family: Not on file     Frequency of Social Gatherings with Friends and Family: Three times a week     Attends [employer] Services: Not on file     Active Member of Clubs or Organizations: Not on file     Attends Club or Organization Meetings: Not on file     Marital Status: Not on file   Interpersonal Safety: Low Risk  (1/8/2025)    Interpersonal Safety     Do you feel physically and emotionally safe where you currently live?: Yes     Within the past 12 months, have you been hit, slapped, kicked or otherwise physically hurt by someone?: No     Within the past 12 months, have you been humiliated or emotionally abused in other ways by your partner or ex-partner?: No   Housing Stability: Low Risk  (1/7/2025)    Housing Stability     Do you have housing? : Yes     Are you worried about losing your housing?: No     ROS  negative other than stated above    Exam:  {:640491}    ***   and pressure.  Exam consistent with otitis media and sinusitis.  Amoxicillin as directed.  Will continue with over-the-counter meds for symptomatic relief.  Continue to push fluids.  Increase fluids hot packs to the face and steam.  Will return to clinic if symptoms worsen or new symptoms develop.    (J01.90) Acute sinusitis with symptoms > 10 days  Comment:   Plan: amoxicillin (AMOXIL) 875 MG tablet

## 2025-04-12 ENCOUNTER — HEALTH MAINTENANCE LETTER (OUTPATIENT)
Age: 70
End: 2025-04-12

## 2025-04-25 ENCOUNTER — TELEPHONE (OUTPATIENT)
Dept: PEDIATRICS | Facility: CLINIC | Age: 70
End: 2025-04-25
Payer: COMMERCIAL

## 2025-04-28 ENCOUNTER — MYC REFILL (OUTPATIENT)
Dept: FAMILY MEDICINE | Facility: CLINIC | Age: 70
End: 2025-04-28
Payer: COMMERCIAL

## 2025-04-28 ENCOUNTER — MYC REFILL (OUTPATIENT)
Dept: PHARMACY | Facility: CLINIC | Age: 70
End: 2025-04-28
Payer: COMMERCIAL

## 2025-04-28 DIAGNOSIS — E11.29 TYPE 2 DIABETES MELLITUS WITH MICROALBUMINURIA, WITHOUT LONG-TERM CURRENT USE OF INSULIN (H): ICD-10-CM

## 2025-04-28 DIAGNOSIS — E78.5 HYPERLIPIDEMIA LDL GOAL <100: ICD-10-CM

## 2025-04-28 DIAGNOSIS — R80.9 TYPE 2 DIABETES MELLITUS WITH MICROALBUMINURIA, WITHOUT LONG-TERM CURRENT USE OF INSULIN (H): ICD-10-CM

## 2025-04-28 NOTE — TELEPHONE ENCOUNTER
PRIOR AUTHORIZATION DENIED    Medication: FREESTYLE PETER 3 PLUS SENSOR MISC  Insurance Company: [employer] - Phone [phone] Fax [phone]  Denial Date: 4/25/2025  Denial Reason(s): No documentation to a hypoglycemic level 2 or 3 event  within 6 months prior to initiation of CGM      Appeal Information:       Patient Notified: No    
Stephanie Capps  [facility] Pool3 days ago       Hello- This PA has been denied, please see denial rationale.    If provider would like to appeal please review the plan's reasons for denial listed. Please utilize that information to complete letter and provide specific, detailed clinical information/rationale of your patient's health status to address their denial reasons and place letter in patient's chart. The encounter can be routed directly back to me to start appeal.     If done with encounter, please notify the patient and close the encounter.  Thank you,  Isha     
(4) no impairment

## 2025-04-29 RX ORDER — METFORMIN HYDROCHLORIDE 500 MG/1
2000 TABLET, EXTENDED RELEASE ORAL
Qty: 360 TABLET | Refills: 1 | Status: SHIPPED | OUTPATIENT
Start: 2025-04-29

## 2025-04-29 RX ORDER — ATORVASTATIN CALCIUM 20 MG/1
20 TABLET, FILM COATED ORAL DAILY
Qty: 90 TABLET | Refills: 2 | Status: SHIPPED | OUTPATIENT
Start: 2025-04-29

## 2025-06-01 ENCOUNTER — TRANSFERRED RECORDS (OUTPATIENT)
Dept: MULTI SPECIALTY CLINIC | Facility: CLINIC | Age: 70
End: 2025-06-01
Payer: COMMERCIAL

## 2025-06-01 LAB — RETINOPATHY: NORMAL

## 2025-06-16 NOTE — PROGRESS NOTES
Medication Therapy Management (MTM) Encounter    ASSESSMENT:                            Medication Adherence/Access: See below for considerations.    Diabetes  Patient is not meeting A1c goal of < 7%.  Patient would benefit from changing Moujaro to Soliqua due to cost and starting continuous glucose monitoring, we did that together in clinic today.     Hyperlipidemia   Stable.    Osteopenia:   Should repeat DEXA 1-2 years.    Vaginal itching:  she wants to monitor symptoms and will reach out to PCP if clobetasol is needed  PLAN:                            Diabetes:   Stop Mounjaro  Start Soliqua 15 units once daily  Titrate the dosage upwards or downwards by 2 to 4 units (insulin glargine 2 to 4 units/lixisenatide 0.66 to 1.32 mcg) every week until the desired fasting plasma glucose is achieved   3.  Check hemoglobin A1C in 3 months    Follow-up: Return in about 1 month (around 7/21/2025) for MTM Pharmacist Visit, phone visit.    SUBJECTIVE/OBJECTIVE:                          Suyapa Ware is a 69 year old female seen for a follow-up visit from 1/21/2025 with Germania SinghD.       Reason for visit: retired Monday and going to [employer] need refills.    Allergies/ADRs: Reviewed in chart  Past Medical History: Reviewed in chart  Tobacco: She reports that she quit smoking about 42 years ago. Her smoking use included cigarettes. She started smoking about 51 years ago. She has a 4.5 pack-year smoking history. She has never been exposed to tobacco smoke. She has never used smokeless tobacco.  Alcohol: Less than 1 beverage / month  Caffeine: 1 cup coffee, 1 soda  Medication Adherence/Access: Patient takes medications directly from bottles.  Per patient, misses medication 0 time(s) per week.   The patient fills medications at [facility]: NO, fills medications at [facility].    Diabetes   Metformin 2000 mg daily  Moujaro 2.5mg once weekly started one month ago  No side effects.  Things are stressful, taking care  with Mom who is 93 years old, she moved in with her last November.  She is not eating well.  Not exercising very much and hope to get to this.  She retired on Monday and is concerned with cost of medications.    Patient has a biking machine  History: Soliqua, changed to Mounjaro     Blood sugar monitoring: never, the continuous glucose monitor was not covered  She placed continuous glucose monitor   Eye exam in the last 12 months? Yes- Date of last eye exam: 6/1/2025,  Location: [facility]  Foot exam: due  Lab Results   Component Value Date    A1C 8.0 01/08/2025    A1C 7.8 07/22/2024    A1C 13.8 02/06/2024       Hyperlipidemia   Atorvastatin 20 mg daily  No issues     Recent Labs   Lab Test 01/21/25  0939 01/08/25  0749   CHOL 141 130   HDL 46* 37*   LDL 67 57   TRIG 140 181*     Bone Health   - Osteopenia  Vitamin D 2000 units daily  Patient is not experiencing side effects.  DEXA History: 3/2024  Patient is getting  < 3 servings of calcium per day .       Vaginal cream  Getting some itching she use to be on a cream and wondering if she needs it again  Per chart review: Clobetasol 0.05% ointment    Plexus supplement not currently taking but did previously she reports, not sure she will restart    Today's Vitals: /78   Pulse 91   Wt 163 lb 9.6 oz (74.2 kg)   LMP  (LMP Unknown)   SpO2 100%   BMI 26.41 kg/m    ----------------    I spent 30 minutes with this patient today. All changes were made via collaborative practice agreement with JOSHUA Larose CNP.     A summary of these recommendations was given to the patient.    Cata Talbot, PharmD BCPS  Medication Therapy Management Practitioner  Pharmacist         Medication Therapy Recommendations  Type 2 diabetes mellitus with microalbuminuria, without long-term current use of insulin (H)   1 Current Medication: tirzepatide (MOUNJARO) 2.5 MG/0.5ML SOAJ auto-injector pen (Discontinued)   Current Medication Sig: Inject 0.5 mLs (2.5 mg)  subcutaneously every 7 days.   Rationale: Cannot afford medication product - Cost - Adherence   Recommendation: Change Medication - Soliqua 100-33 UNT-MCG/ML Sopn   Status: Accepted per CPA   Identified Date: 6/19/2025 Completed Date: 6/19/2025

## 2025-06-19 ENCOUNTER — OFFICE VISIT (OUTPATIENT)
Dept: PHARMACY | Facility: CLINIC | Age: 70
End: 2025-06-19
Payer: COMMERCIAL

## 2025-06-19 VITALS
HEART RATE: 91 BPM | WEIGHT: 163.6 LBS | SYSTOLIC BLOOD PRESSURE: 114 MMHG | OXYGEN SATURATION: 100 % | DIASTOLIC BLOOD PRESSURE: 78 MMHG | BODY MASS INDEX: 26.41 KG/M2

## 2025-06-19 DIAGNOSIS — L30.9 VULVAR DERMATITIS: ICD-10-CM

## 2025-06-19 DIAGNOSIS — M85.80 OSTEOPENIA, UNSPECIFIED LOCATION: ICD-10-CM

## 2025-06-19 DIAGNOSIS — E11.29 TYPE 2 DIABETES MELLITUS WITH MICROALBUMINURIA, WITHOUT LONG-TERM CURRENT USE OF INSULIN (H): Primary | ICD-10-CM

## 2025-06-19 DIAGNOSIS — E78.5 HYPERLIPIDEMIA LDL GOAL <70: ICD-10-CM

## 2025-06-19 DIAGNOSIS — R80.9 TYPE 2 DIABETES MELLITUS WITH MICROALBUMINURIA, WITHOUT LONG-TERM CURRENT USE OF INSULIN (H): Primary | ICD-10-CM

## 2025-06-19 RX ORDER — INSULIN GLARGINE AND LIXISENATIDE 100; 33 U/ML; UG/ML
15 INJECTION, SOLUTION SUBCUTANEOUS
Qty: 30 ML | Refills: 1 | Status: SHIPPED | OUTPATIENT
Start: 2025-06-19

## 2025-06-19 NOTE — PATIENT INSTRUCTIONS
Recommendations from today's MTM visit:                                                    MTM (medication therapy management) is a service provided by a clinical pharmacist designed to help you get the most of out of your medicines.     Diabetes:   Stop Mounjaro  Start Soliqua 15 units once daily  Titrate the dosage upwards or downwards by 2 to 4 units (insulin glargine 2 to 4 units/lixisenatide 0.66 to 1.32 mcg) every week until the desired fasting plasma glucose is achieved   3.  Check hemoglobin A1C in 3 month    Follow-up: Return in about 1 month (around 7/21/2025) for MTM Pharmacist Visit, phone visit.    To schedule another MTM appointment, please call the clinic directly or you may call the MTM scheduling line at [phone] or toll-free at [phone].     My Clinical Pharmacist's contact information:                                                      It was a pleasure seeing you today!  Please feel free to contact me with any questions or concerns you have.      Cata Talbot, PharmD BCPS  Medication Therapy Management Practitioner    It was great to speak with you today.  I value your experience and would be very thankful for your time with providing feedback on our clinic survey. You may receive a survey via email or text message in the next few days.

## 2025-07-26 ENCOUNTER — HEALTH MAINTENANCE LETTER (OUTPATIENT)
Age: 70
End: 2025-07-26

## 2025-07-30 ENCOUNTER — TELEPHONE (OUTPATIENT)
Dept: PEDIATRICS | Facility: CLINIC | Age: 70
End: 2025-07-30

## 2025-08-18 ENCOUNTER — MYC REFILL (OUTPATIENT)
Dept: PHARMACY | Facility: CLINIC | Age: 70
End: 2025-08-18

## 2025-08-18 ENCOUNTER — MYC REFILL (OUTPATIENT)
Dept: FAMILY MEDICINE | Facility: CLINIC | Age: 70
End: 2025-08-18

## 2025-08-18 DIAGNOSIS — R80.9 TYPE 2 DIABETES MELLITUS WITH MICROALBUMINURIA, WITHOUT LONG-TERM CURRENT USE OF INSULIN (H): ICD-10-CM

## 2025-08-18 DIAGNOSIS — E78.5 HYPERLIPIDEMIA LDL GOAL <100: ICD-10-CM

## 2025-08-18 DIAGNOSIS — E11.29 TYPE 2 DIABETES MELLITUS WITH MICROALBUMINURIA, WITHOUT LONG-TERM CURRENT USE OF INSULIN (H): ICD-10-CM

## 2025-08-18 RX ORDER — ATORVASTATIN CALCIUM 20 MG/1
20 TABLET, FILM COATED ORAL DAILY
Qty: 90 TABLET | Refills: 2 | OUTPATIENT
Start: 2025-08-18

## 2025-08-18 RX ORDER — METFORMIN HYDROCHLORIDE 500 MG/1
2000 TABLET, EXTENDED RELEASE ORAL
Qty: 360 TABLET | Refills: 1 | OUTPATIENT
Start: 2025-08-18